# Patient Record
Sex: FEMALE | Race: WHITE | HISPANIC OR LATINO
[De-identification: names, ages, dates, MRNs, and addresses within clinical notes are randomized per-mention and may not be internally consistent; named-entity substitution may affect disease eponyms.]

---

## 2017-02-02 ENCOUNTER — FORM ENCOUNTER (OUTPATIENT)
Age: 37
End: 2017-02-02

## 2017-02-03 ENCOUNTER — APPOINTMENT (OUTPATIENT)
Dept: RHEUMATOLOGY | Facility: CLINIC | Age: 37
End: 2017-02-03

## 2017-02-03 VITALS
HEIGHT: 64 IN | SYSTOLIC BLOOD PRESSURE: 105 MMHG | DIASTOLIC BLOOD PRESSURE: 69 MMHG | BODY MASS INDEX: 25.61 KG/M2 | TEMPERATURE: 98.2 F | OXYGEN SATURATION: 97 % | HEART RATE: 79 BPM | WEIGHT: 150 LBS

## 2017-02-03 DIAGNOSIS — R21 RASH AND OTHER NONSPECIFIC SKIN ERUPTION: ICD-10-CM

## 2017-02-03 DIAGNOSIS — M25.642 STIFFNESS OF LEFT HAND, NOT ELSEWHERE CLASSIFIED: ICD-10-CM

## 2017-02-03 DIAGNOSIS — M62.838 OTHER MUSCLE SPASM: ICD-10-CM

## 2017-02-03 DIAGNOSIS — E55.9 VITAMIN D DEFICIENCY, UNSPECIFIED: ICD-10-CM

## 2017-02-21 ENCOUNTER — RX RENEWAL (OUTPATIENT)
Age: 37
End: 2017-02-21

## 2017-03-01 ENCOUNTER — APPOINTMENT (OUTPATIENT)
Dept: DERMATOLOGY | Facility: CLINIC | Age: 37
End: 2017-03-01

## 2017-03-01 DIAGNOSIS — L21.9 SEBORRHEIC DERMATITIS, UNSPECIFIED: ICD-10-CM

## 2017-03-01 LAB
25(OH)D3 SERPL-MCNC: 27 NG/ML
ALBUMIN SERPL ELPH-MCNC: 4.3 G/DL
ALP BLD-CCNC: 52 U/L
ALT SERPL-CCNC: 15 U/L
ANION GAP SERPL CALC-SCNC: 11 MMOL/L
APPEARANCE: CLEAR
AST SERPL-CCNC: 18 U/L
BACTERIA: ABNORMAL
BASOPHILS # BLD AUTO: 0.01 K/UL
BASOPHILS NFR BLD AUTO: 0.2 %
BILIRUB SERPL-MCNC: 1.2 MG/DL
BILIRUBIN URINE: NEGATIVE
BLOOD URINE: NEGATIVE
BUN SERPL-MCNC: 8 MG/DL
CALCIUM SERPL-MCNC: 9 MG/DL
CCP AB SER IA-ACNC: 10 UNITS
CENTROMERE IGG SER-ACNC: <0.2 AL
CHLORIDE SERPL-SCNC: 103 MMOL/L
CK SERPL-CCNC: 80 U/L
CO2 SERPL-SCNC: 27 MMOL/L
COLOR: YELLOW
CREAT SERPL-MCNC: 0.47 MG/DL
CREAT SPEC-SCNC: 98 MG/DL
CREAT/PROT UR: 0.1 RATIO
CRP SERPL-MCNC: 0.24 MG/DL
DSDNA AB SER-ACNC: <12 IU/ML
ENA RNP AB SER IA-ACNC: <0.2 AL
ENA SCL70 IGG SER IA-ACNC: <0.2 AL
ENA SM AB SER IA-ACNC: <0.2 AL
ENA SS-A AB SER IA-ACNC: <0.2 AL
ENA SS-B AB SER IA-ACNC: <0.2 AL
EOSINOPHIL # BLD AUTO: 0.1 K/UL
EOSINOPHIL NFR BLD AUTO: 1.9 %
ERYTHROCYTE [SEDIMENTATION RATE] IN BLOOD BY WESTERGREN METHOD: 14 MM/HR
G6PD SER-CCNC: 6.4 U/G HB
GLUCOSE QUALITATIVE U: NORMAL MG/DL
GLUCOSE SERPL-MCNC: 84 MG/DL
HCT VFR BLD CALC: 43.6 %
HGB BLD-MCNC: 13.6 G/DL
HYALINE CASTS: 1 /LPF
IMM GRANULOCYTES NFR BLD AUTO: 0.2 %
KETONES URINE: NEGATIVE
LEUKOCYTE ESTERASE URINE: ABNORMAL
LYMPHOCYTES # BLD AUTO: 2.2 K/UL
LYMPHOCYTES NFR BLD AUTO: 42 %
MAN DIFF?: NORMAL
MCHC RBC-ENTMCNC: 28.8 PG
MCHC RBC-ENTMCNC: 31.2 GM/DL
MCV RBC AUTO: 92.4 FL
MICROSCOPIC-UA: NORMAL
MISCELLANEOUS TEST: NORMAL
MONOCYTES # BLD AUTO: 0.22 K/UL
MONOCYTES NFR BLD AUTO: 4.2 %
NEUTROPHILS # BLD AUTO: 2.7 K/UL
NEUTROPHILS NFR BLD AUTO: 51.5 %
NITRITE URINE: NEGATIVE
PH URINE: 8
PLATELET # BLD AUTO: 237 K/UL
POTASSIUM SERPL-SCNC: 4.2 MMOL/L
PROC NAME: NORMAL
PROT SERPL-MCNC: 7.3 G/DL
PROT UR-MCNC: 10 MG/DL
PROTEIN URINE: NEGATIVE MG/DL
RBC # BLD: 4.72 M/UL
RBC # FLD: 12.9 %
RED BLOOD CELLS URINE: 2 /HPF
RF+CCP IGG SER-IMP: NEGATIVE
RHEUMATOID FACT SER QL: <7 IU/ML
RNA POLYMERASE III IGG: 11.6 U
SODIUM SERPL-SCNC: 141 MMOL/L
SPECIFIC GRAVITY URINE: 1.02
SQUAMOUS EPITHELIAL CELLS: 4 /HPF
UROBILINOGEN URINE: 1 MG/DL
WBC # FLD AUTO: 5.24 K/UL
WHITE BLOOD CELLS URINE: 9 /HPF

## 2017-03-02 PROBLEM — L21.9 SEBORRHEIC DERMATITIS: Status: ACTIVE | Noted: 2017-03-01

## 2017-03-03 ENCOUNTER — APPOINTMENT (OUTPATIENT)
Dept: RHEUMATOLOGY | Facility: CLINIC | Age: 37
End: 2017-03-03

## 2017-03-03 VITALS
HEIGHT: 64 IN | TEMPERATURE: 97.6 F | DIASTOLIC BLOOD PRESSURE: 70 MMHG | HEART RATE: 72 BPM | SYSTOLIC BLOOD PRESSURE: 101 MMHG | OXYGEN SATURATION: 97 % | WEIGHT: 150 LBS | BODY MASS INDEX: 25.61 KG/M2

## 2017-04-21 ENCOUNTER — APPOINTMENT (OUTPATIENT)
Dept: RHEUMATOLOGY | Facility: CLINIC | Age: 37
End: 2017-04-21

## 2017-05-13 ENCOUNTER — TRANSCRIPTION ENCOUNTER (OUTPATIENT)
Age: 37
End: 2017-05-13

## 2017-05-16 ENCOUNTER — OUTPATIENT (OUTPATIENT)
Dept: OUTPATIENT SERVICES | Facility: HOSPITAL | Age: 37
LOS: 1 days | End: 2017-05-16
Payer: COMMERCIAL

## 2017-05-16 VITALS
DIASTOLIC BLOOD PRESSURE: 77 MMHG | TEMPERATURE: 98 F | HEART RATE: 92 BPM | HEIGHT: 64 IN | RESPIRATION RATE: 16 BRPM | OXYGEN SATURATION: 997 % | WEIGHT: 151.9 LBS | SYSTOLIC BLOOD PRESSURE: 111 MMHG

## 2017-05-16 DIAGNOSIS — Z01.818 ENCOUNTER FOR OTHER PREPROCEDURAL EXAMINATION: ICD-10-CM

## 2017-05-16 DIAGNOSIS — S92.309A FRACTURE OF UNSPECIFIED METATARSAL BONE(S), UNSPECIFIED FOOT, INITIAL ENCOUNTER FOR CLOSED FRACTURE: ICD-10-CM

## 2017-05-16 DIAGNOSIS — S92.352A DISPLACED FRACTURE OF FIFTH METATARSAL BONE, LEFT FOOT, INITIAL ENCOUNTER FOR CLOSED FRACTURE: ICD-10-CM

## 2017-05-16 DIAGNOSIS — N75.0 CYST OF BARTHOLIN'S GLAND: Chronic | ICD-10-CM

## 2017-05-16 LAB
HCT VFR BLD CALC: 40.3 % — SIGNIFICANT CHANGE UP (ref 34.5–45)
HGB BLD-MCNC: 13.1 G/DL — SIGNIFICANT CHANGE UP (ref 11.5–15.5)
MCHC RBC-ENTMCNC: 29.4 PG — SIGNIFICANT CHANGE UP (ref 27–34)
MCHC RBC-ENTMCNC: 32.5 GM/DL — SIGNIFICANT CHANGE UP (ref 32–36)
MCV RBC AUTO: 90.6 FL — SIGNIFICANT CHANGE UP (ref 80–100)
PLATELET # BLD AUTO: 212 K/UL — SIGNIFICANT CHANGE UP (ref 150–400)
RBC # BLD: 4.45 M/UL — SIGNIFICANT CHANGE UP (ref 3.8–5.2)
RBC # FLD: 13.3 % — SIGNIFICANT CHANGE UP (ref 10.3–14.5)
WBC # BLD: 7.58 K/UL — SIGNIFICANT CHANGE UP (ref 3.8–10.5)
WBC # FLD AUTO: 7.58 K/UL — SIGNIFICANT CHANGE UP (ref 3.8–10.5)

## 2017-05-16 PROCEDURE — 85027 COMPLETE CBC AUTOMATED: CPT

## 2017-05-16 RX ORDER — SODIUM CHLORIDE 9 MG/ML
3 INJECTION INTRAMUSCULAR; INTRAVENOUS; SUBCUTANEOUS EVERY 8 HOURS
Qty: 0 | Refills: 0 | Status: DISCONTINUED | OUTPATIENT
Start: 2017-05-18 | End: 2017-06-02

## 2017-05-16 RX ORDER — ACETAMINOPHEN 500 MG
975 TABLET ORAL ONCE
Qty: 0 | Refills: 0 | Status: COMPLETED | OUTPATIENT
Start: 2017-05-18 | End: 2017-05-18

## 2017-05-16 RX ORDER — LIDOCAINE HCL 20 MG/ML
0.2 VIAL (ML) INJECTION ONCE
Qty: 0 | Refills: 0 | Status: DISCONTINUED | OUTPATIENT
Start: 2017-05-18 | End: 2017-06-02

## 2017-05-16 RX ORDER — CELECOXIB 200 MG/1
200 CAPSULE ORAL ONCE
Qty: 0 | Refills: 0 | Status: COMPLETED | OUTPATIENT
Start: 2017-05-18 | End: 2017-05-18

## 2017-05-16 RX ORDER — CEFAZOLIN SODIUM 1 G
2000 VIAL (EA) INJECTION ONCE
Qty: 0 | Refills: 0 | Status: DISCONTINUED | OUTPATIENT
Start: 2017-05-18 | End: 2017-06-02

## 2017-05-16 NOTE — H&P PST ADULT - HISTORY OF PRESENT ILLNESS
36 y.o. female presents with Left 5th metatarsal fracture s/p fall at home 5 days ago. She is scheduled for Left Foot 5th Metatarsal ORIF.

## 2017-05-16 NOTE — H&P PST ADULT - NSANTHOSAYNRD_GEN_A_CORE
No. HARINI screening performed.  STOP BANG Legend: 0-2 = LOW Risk; 3-4 = INTERMEDIATE Risk; 5-8 = HIGH Risk

## 2017-05-16 NOTE — H&P PST ADULT - PMH
VERA positive    Dermatitis    Fatigue    Inflammatory arthritis  questionable, all testing negative, but presents with arthritis like changes and morning stiffness  Metatarsal fracture  Left foot,  5th metatarsal  Pneumonia  with septic shock, 03/2016  Raynaud's disease VERA positive    Dermatitis    Fatigue    Inflammatory arthritis    Metatarsal fracture  Left foot,  5th metatarsal  Pneumonia  with septic shock, 03/2016  Raynaud's disease

## 2017-05-18 ENCOUNTER — OUTPATIENT (OUTPATIENT)
Dept: OUTPATIENT SERVICES | Facility: HOSPITAL | Age: 37
LOS: 1 days | End: 2017-05-18
Payer: COMMERCIAL

## 2017-05-18 ENCOUNTER — TRANSCRIPTION ENCOUNTER (OUTPATIENT)
Age: 37
End: 2017-05-18

## 2017-05-18 VITALS
SYSTOLIC BLOOD PRESSURE: 105 MMHG | HEART RATE: 90 BPM | RESPIRATION RATE: 16 BRPM | TEMPERATURE: 98 F | OXYGEN SATURATION: 100 % | DIASTOLIC BLOOD PRESSURE: 66 MMHG

## 2017-05-18 VITALS
TEMPERATURE: 98 F | DIASTOLIC BLOOD PRESSURE: 63 MMHG | HEIGHT: 64 IN | WEIGHT: 151.9 LBS | SYSTOLIC BLOOD PRESSURE: 95 MMHG | HEART RATE: 95 BPM | OXYGEN SATURATION: 98 % | RESPIRATION RATE: 15 BRPM

## 2017-05-18 DIAGNOSIS — N75.0 CYST OF BARTHOLIN'S GLAND: Chronic | ICD-10-CM

## 2017-05-18 DIAGNOSIS — S92.352A DISPLACED FRACTURE OF FIFTH METATARSAL BONE, LEFT FOOT, INITIAL ENCOUNTER FOR CLOSED FRACTURE: ICD-10-CM

## 2017-05-18 PROCEDURE — C1713: CPT

## 2017-05-18 PROCEDURE — 28485 OPTX METATARSAL FX EACH: CPT | Mod: LT

## 2017-05-18 RX ORDER — SODIUM CHLORIDE 9 MG/ML
1000 INJECTION, SOLUTION INTRAVENOUS
Qty: 0 | Refills: 0 | Status: DISCONTINUED | OUTPATIENT
Start: 2017-05-18 | End: 2017-06-02

## 2017-05-18 RX ORDER — OXYCODONE HYDROCHLORIDE 5 MG/1
10 TABLET ORAL ONCE
Qty: 0 | Refills: 0 | Status: DISCONTINUED | OUTPATIENT
Start: 2017-05-18 | End: 2017-05-18

## 2017-05-18 RX ORDER — ONDANSETRON 8 MG/1
4 TABLET, FILM COATED ORAL ONCE
Qty: 0 | Refills: 0 | Status: COMPLETED | OUTPATIENT
Start: 2017-05-18 | End: 2017-05-18

## 2017-05-18 RX ORDER — CELECOXIB 200 MG/1
200 CAPSULE ORAL ONCE
Qty: 0 | Refills: 0 | Status: COMPLETED | OUTPATIENT
Start: 2017-05-18 | End: 2017-05-18

## 2017-05-18 RX ADMIN — Medication 975 MILLIGRAM(S): at 14:37

## 2017-05-18 RX ADMIN — CELECOXIB 200 MILLIGRAM(S): 200 CAPSULE ORAL at 17:31

## 2017-05-18 RX ADMIN — CELECOXIB 200 MILLIGRAM(S): 200 CAPSULE ORAL at 14:37

## 2017-05-18 RX ADMIN — ONDANSETRON 4 MILLIGRAM(S): 8 TABLET, FILM COATED ORAL at 17:32

## 2017-05-18 RX ADMIN — OXYCODONE HYDROCHLORIDE 10 MILLIGRAM(S): 5 TABLET ORAL at 17:32

## 2017-05-18 NOTE — ASU DISCHARGE PLAN (ADULT/PEDIATRIC). - MEDICATION SUMMARY - MEDICATIONS TO TAKE
I will START or STAY ON the medications listed below when I get home from the hospital:    Percocet 5/325 oral tablet  -- 1 tab(s) by mouth every 4 hours, As Needed MDD:6 tabs  -- Caution federal law prohibits the transfer of this drug to any person other  than the person for whom it was prescribed.  May cause drowsiness.  Alcohol may intensify this effect.  Use care when operating dangerous machinery.  This prescription cannot be refilled.  This product contains acetaminophen.  Do not use  with any other product containing acetaminophen to prevent possible liver damage.  Using more of this medication than prescribed may cause serious breathing problems.    -- Indication: For pain    amLODIPine 2.5 mg oral tablet  -- 1 tab(s) by mouth once a day  -- Indication: For home med    Pepcid 20 mg oral tablet  --  by mouth 2 doses preop  -- Indication: For home med

## 2017-05-18 NOTE — ASU DISCHARGE PLAN (ADULT/PEDIATRIC). - NOTIFY
Swelling that continues/Numbness, color, or temperature change to extremity/Fever greater than 101/Bleeding that does not stop/Pain not relieved by Medications

## 2017-05-18 NOTE — ASU DISCHARGE PLAN (ADULT/PEDIATRIC). - SPECIAL INSTRUCTIONS
non-weight bearing in boot for two weeks on left leg  keep dressing clean and dry  follow up with Dr Markham in 7-10 days. Call

## 2017-05-18 NOTE — ASU PATIENT PROFILE, ADULT - PMH
VERA positive    Dermatitis    Fatigue    Inflammatory arthritis    Metatarsal fracture  Left foot,  5th metatarsal  Pneumonia  with septic shock, 03/2016  Raynaud's disease

## 2017-06-26 ENCOUNTER — RX RENEWAL (OUTPATIENT)
Age: 37
End: 2017-06-26

## 2017-06-29 ENCOUNTER — APPOINTMENT (OUTPATIENT)
Dept: RHEUMATOLOGY | Facility: CLINIC | Age: 37
End: 2017-06-29

## 2017-10-24 ENCOUNTER — APPOINTMENT (OUTPATIENT)
Dept: RHEUMATOLOGY | Facility: CLINIC | Age: 37
End: 2017-10-24
Payer: COMMERCIAL

## 2017-10-24 VITALS
RESPIRATION RATE: 16 BRPM | HEART RATE: 70 BPM | SYSTOLIC BLOOD PRESSURE: 115 MMHG | OXYGEN SATURATION: 98 % | DIASTOLIC BLOOD PRESSURE: 67 MMHG | HEIGHT: 64 IN | TEMPERATURE: 97.8 F

## 2017-10-24 PROCEDURE — 99215 OFFICE O/P EST HI 40 MIN: CPT

## 2017-10-30 LAB
25(OH)D3 SERPL-MCNC: 29.6 NG/ML
ALBUMIN SERPL ELPH-MCNC: 4.4 G/DL
ALP BLD-CCNC: 53 U/L
ALT SERPL-CCNC: 16 U/L
ANION GAP SERPL CALC-SCNC: 13 MMOL/L
APPEARANCE: CLEAR
AST SERPL-CCNC: 16 U/L
B2 GLYCOPROT1 AB SER QL: NEGATIVE
B2 GLYCOPROT1 IGA SERPL IA-ACNC: <5 SAU
BACTERIA: NEGATIVE
BASOPHILS # BLD AUTO: 0.01 K/UL
BASOPHILS NFR BLD AUTO: 0.2 %
BILIRUB SERPL-MCNC: 0.8 MG/DL
BILIRUBIN URINE: NEGATIVE
BLOOD URINE: NEGATIVE
BUN SERPL-MCNC: 15 MG/DL
C3 SERPL-MCNC: 99 MG/DL
C4 SERPL-MCNC: 16 MG/DL
CALCIUM SERPL-MCNC: 9.5 MG/DL
CARDIOLIPIN AB SER IA-ACNC: NEGATIVE
CCP AB SER IA-ACNC: <8 UNITS
CENTROMERE IGG SER-ACNC: <0.2 AL
CHLORIDE SERPL-SCNC: 104 MMOL/L
CK SERPL-CCNC: 59 U/L
CO2 SERPL-SCNC: 25 MMOL/L
COLOR: YELLOW
CONFIRM: 26.1 SEC
CREAT SERPL-MCNC: 0.56 MG/DL
CREAT SPEC-SCNC: 162 MG/DL
CREAT/PROT UR: 0.1 RATIO
CRP SERPL-MCNC: 0.4 MG/DL
DRVVT IMM 1:2 NP PPP: NORMAL
DRVVT SCREEN TO CONFIRM RATIO: 0.93 RATIO
DSDNA AB SER-ACNC: <12 IU/ML
ENA RNP AB SER IA-ACNC: <0.2 AL
ENA SCL70 IGG SER IA-ACNC: <0.2 AL
ENA SM AB SER IA-ACNC: <0.2 AL
ENA SS-A AB SER IA-ACNC: <0.2 AL
ENA SS-B AB SER IA-ACNC: <0.2 AL
EOSINOPHIL # BLD AUTO: 0.38 K/UL
EOSINOPHIL NFR BLD AUTO: 6.2 %
GLUCOSE QUALITATIVE U: NEGATIVE MG/DL
GLUCOSE SERPL-MCNC: 74 MG/DL
HCT VFR BLD CALC: 40.7 %
HGB BLD-MCNC: 13.1 G/DL
HYALINE CASTS: 2 /LPF
IMM GRANULOCYTES NFR BLD AUTO: 0.2 %
KETONES URINE: NEGATIVE
LEUKOCYTE ESTERASE URINE: NEGATIVE
LYMPHOCYTES # BLD AUTO: 2.02 K/UL
LYMPHOCYTES NFR BLD AUTO: 32.7 %
MAN DIFF?: NORMAL
MCHC RBC-ENTMCNC: 29.6 PG
MCHC RBC-ENTMCNC: 32.2 GM/DL
MCV RBC AUTO: 92.1 FL
MICROSCOPIC-UA: NORMAL
MONOCYTES # BLD AUTO: 0.38 K/UL
MONOCYTES NFR BLD AUTO: 6.2 %
NEUTROPHILS # BLD AUTO: 3.37 K/UL
NEUTROPHILS NFR BLD AUTO: 54.5 %
NITRITE URINE: NEGATIVE
PH URINE: 5.5
PLATELET # BLD AUTO: 219 K/UL
POTASSIUM SERPL-SCNC: 4 MMOL/L
PROT SERPL-MCNC: 7.3 G/DL
PROT UR-MCNC: 13 MG/DL
PROTEIN URINE: NEGATIVE MG/DL
RBC # BLD: 4.42 M/UL
RBC # FLD: 13.2 %
RED BLOOD CELLS URINE: 4 /HPF
RF+CCP IGG SER-IMP: NEGATIVE
RNA POLYMERASE III IGG: <10 U
SCREEN DRVVT: 27.3 SEC
SILICA CLOTTING TIME INTERPRETATION: NORMAL
SILICA CLOTTING TIME S/C: 1.02 RATIO
SODIUM SERPL-SCNC: 142 MMOL/L
SPECIFIC GRAVITY URINE: 1.03
SQUAMOUS EPITHELIAL CELLS: 2 /HPF
UROBILINOGEN URINE: NEGATIVE MG/DL
WBC # FLD AUTO: 6.17 K/UL
WHITE BLOOD CELLS URINE: 3 /HPF

## 2017-11-20 LAB
TPMT ENZYME INTERPRETATION: NORMAL
TPMT ENZYME METHODOLOGY: NORMAL
TPMT ENZYME: 19.3

## 2017-12-08 ENCOUNTER — APPOINTMENT (OUTPATIENT)
Dept: RHEUMATOLOGY | Facility: CLINIC | Age: 37
End: 2017-12-08

## 2018-03-14 ENCOUNTER — APPOINTMENT (OUTPATIENT)
Dept: INTERNAL MEDICINE | Facility: CLINIC | Age: 38
End: 2018-03-14
Payer: COMMERCIAL

## 2018-03-14 VITALS
WEIGHT: 176 LBS | HEART RATE: 85 BPM | HEIGHT: 64 IN | OXYGEN SATURATION: 95 % | TEMPERATURE: 99.5 F | RESPIRATION RATE: 16 BRPM | BODY MASS INDEX: 30.05 KG/M2 | DIASTOLIC BLOOD PRESSURE: 76 MMHG | SYSTOLIC BLOOD PRESSURE: 116 MMHG

## 2018-03-14 DIAGNOSIS — R59.9 ENLARGED LYMPH NODES, UNSPECIFIED: ICD-10-CM

## 2018-03-14 LAB — RESULT: NEGATIVE

## 2018-03-14 PROCEDURE — 99214 OFFICE O/P EST MOD 30 MIN: CPT

## 2018-03-14 RX ORDER — MELOXICAM 15 MG/1
15 TABLET ORAL AT BEDTIME
Qty: 90 | Refills: 0 | Status: DISCONTINUED | COMMUNITY
Start: 2017-02-03 | End: 2018-03-14

## 2018-03-14 RX ORDER — PREDNISONE 2.5 MG/1
2.5 TABLET ORAL
Qty: 90 | Refills: 1 | Status: DISCONTINUED | COMMUNITY
Start: 2017-03-03 | End: 2018-03-14

## 2018-03-14 RX ORDER — PREDNISONE 2.5 MG/1
2.5 TABLET ORAL DAILY
Qty: 90 | Refills: 0 | Status: DISCONTINUED | COMMUNITY
Start: 2017-10-24 | End: 2018-03-14

## 2018-03-14 RX ORDER — CLOBETASOL PROPIONATE 0.05 G/100ML
0.05 SHAMPOO TOPICAL DAILY
Qty: 1 | Refills: 3 | Status: DISCONTINUED | COMMUNITY
Start: 2017-03-02 | End: 2018-03-14

## 2018-03-14 RX ORDER — AMOXICILLIN AND CLAVULANATE POTASSIUM 500; 125 MG/1; MG/1
500-125 TABLET, FILM COATED ORAL TWICE DAILY
Qty: 20 | Refills: 0 | Status: COMPLETED | COMMUNITY
Start: 2018-03-14 | End: 2018-03-24

## 2018-03-29 ENCOUNTER — APPOINTMENT (OUTPATIENT)
Dept: RHEUMATOLOGY | Facility: CLINIC | Age: 38
End: 2018-03-29
Payer: COMMERCIAL

## 2018-03-29 VITALS
HEIGHT: 64 IN | SYSTOLIC BLOOD PRESSURE: 106 MMHG | HEART RATE: 80 BPM | RESPIRATION RATE: 16 BRPM | TEMPERATURE: 98.7 F | DIASTOLIC BLOOD PRESSURE: 72 MMHG | OXYGEN SATURATION: 96 % | WEIGHT: 177 LBS | BODY MASS INDEX: 30.22 KG/M2

## 2018-03-29 PROCEDURE — 99215 OFFICE O/P EST HI 40 MIN: CPT

## 2018-03-30 LAB
25(OH)D3 SERPL-MCNC: 24.6 NG/ML
ALBUMIN SERPL ELPH-MCNC: 4.2 G/DL
ALP BLD-CCNC: 44 U/L
ALT SERPL-CCNC: 16 U/L
ANION GAP SERPL CALC-SCNC: 15 MMOL/L
APPEARANCE: CLEAR
AST SERPL-CCNC: 18 U/L
BACTERIA: NEGATIVE
BASOPHILS # BLD AUTO: 0.02 K/UL
BASOPHILS NFR BLD AUTO: 0.4 %
BILIRUB SERPL-MCNC: 0.6 MG/DL
BILIRUBIN URINE: NEGATIVE
BLOOD URINE: ABNORMAL
BUN SERPL-MCNC: 11 MG/DL
C3 SERPL-MCNC: 90 MG/DL
C4 SERPL-MCNC: 14 MG/DL
CALCIUM SERPL-MCNC: 8.9 MG/DL
CHLORIDE SERPL-SCNC: 104 MMOL/L
CO2 SERPL-SCNC: 24 MMOL/L
COLOR: YELLOW
CREAT SERPL-MCNC: 0.55 MG/DL
CREAT SPEC-SCNC: 117 MG/DL
CREAT/PROT UR: 0.1 RATIO
EOSINOPHIL # BLD AUTO: 0.32 K/UL
EOSINOPHIL NFR BLD AUTO: 5.9 %
ERYTHROCYTE [SEDIMENTATION RATE] IN BLOOD BY WESTERGREN METHOD: 15 MM/HR
GLUCOSE QUALITATIVE U: NEGATIVE MG/DL
GLUCOSE SERPL-MCNC: 84 MG/DL
HCT VFR BLD CALC: 41.4 %
HGB BLD-MCNC: 12.8 G/DL
HYALINE CASTS: 0 /LPF
IMM GRANULOCYTES NFR BLD AUTO: 0.2 %
KETONES URINE: NEGATIVE
LEUKOCYTE ESTERASE URINE: NEGATIVE
LYMPHOCYTES # BLD AUTO: 2.07 K/UL
LYMPHOCYTES NFR BLD AUTO: 38.1 %
MAN DIFF?: NORMAL
MCHC RBC-ENTMCNC: 29.5 PG
MCHC RBC-ENTMCNC: 30.9 GM/DL
MCV RBC AUTO: 95.4 FL
MICROSCOPIC-UA: NORMAL
MONOCYTES # BLD AUTO: 0.38 K/UL
MONOCYTES NFR BLD AUTO: 7 %
NEUTROPHILS # BLD AUTO: 2.64 K/UL
NEUTROPHILS NFR BLD AUTO: 48.4 %
NITRITE URINE: NEGATIVE
PH URINE: 5
PLATELET # BLD AUTO: 202 K/UL
POTASSIUM SERPL-SCNC: 4.1 MMOL/L
PROT SERPL-MCNC: 7 G/DL
PROT UR-MCNC: 9 MG/DL
PROTEIN URINE: NEGATIVE MG/DL
RBC # BLD: 4.34 M/UL
RBC # FLD: 13.9 %
RED BLOOD CELLS URINE: 3 /HPF
SODIUM SERPL-SCNC: 143 MMOL/L
SPECIFIC GRAVITY URINE: 1.03
SQUAMOUS EPITHELIAL CELLS: 2 /HPF
TSH SERPL-ACNC: 2.21 UIU/ML
UROBILINOGEN URINE: NEGATIVE MG/DL
WBC # FLD AUTO: 5.44 K/UL
WHITE BLOOD CELLS URINE: 4 /HPF

## 2018-04-01 LAB — DSDNA AB SER-ACNC: <12 IU/ML

## 2018-04-26 NOTE — ASU PREOP CHECKLIST - IDENTIFICATION BAND VERIFIED
[address] 
[phone] Patient: Yee Miller MRN: [id number] :1943 Visit Information Date & Time Provider Department Dept. Phone Encounter #  
 2018  2:30 PM Doreen Reyes, [address] [place] [address],[address] [phone] Follow-up Instructions Return in about 3 months (around 2018). Follow-up and Disposition History Upcoming Health Maintenance Date Due ZOSTER VACCINE AGE 60> 10/17/2004 MEDICARE YEARLY EXAM 3/15/2018 Influenza Age 5 to Adult 2018* COLONOSCOPY 2018 BREAST CANCER SCRN MAMMOGRAM 2019 GLAUCOMA SCREENING Q2Y 2019 DTaP/Tdap/Td series (2 - Td) 2022 *Topic was postponed. The date shown is not the original due date. Allergies as of 2018  Review Complete On: 2018 By: Dru Cordova III, DO Severity Noted Reaction Type Reactions Compazine [Prochlorperazine]  2016    Other (comments) Childhood reaction Erythromycin  2016   Intolerance Nausea and Vomiting Indocin [Indomethacin]  2016   Intolerance Other (comments) Made her feel weird to the point it scared her Keflex [Cephalexin]  2016   Intolerance Nausea and Vomiting Current Immunizations  Reviewed on 2016 Name Date Pneumococcal Conjugate (PCV-13) 2016 Pneumococcal Polysaccharide (PPSV-23) 6/15/2017 Not reviewed this visit You Were Diagnosed With   
  
 Codes Comments Essential hypertension    -  Primary ICD-10-CM: I10 
ICD-9-CM: 401.9 Hyperlipidemia LDL goal <70     ICD-10-CM: E78.5 ICD-9-CM: 272.4 Insomnia, unspecified type     ICD-10-CM: G47.00 ICD-9-CM: 780.52 Anxiety     ICD-10-CM: F41.9 ICD-9-CM: 300.00 Osteoarthritis of spine with radiculopathy, cervical region     ICD-10-CM: M47.22 
ICD-9-CM: 721.0 Vitals BP Pulse Temp Resp Height(growth percentile) Weight(growth percentile) 146/72 (BP 1 Location: Left arm, BP Patient Position: Sitting) 72 98.3 °F (36.8 °C) (Oral) 16 5' 7.52\" (1.715 m) 145 lb (65.8 kg) SpO2 BMI OB Status Smoking Status 99% 22.36 kg/m2 Postmenopausal Current Some Day Smoker Vitals History BMI and BSA Data Body Mass Index Body Surface Area  
 22.36 kg/m 2 1.77 m 2 Preferred Pharmacy Pharmacy Name Phone [facility] #[id number]- [address] [facility] [phone] Your Updated Medication List  
  
   
This list is accurate as of 4/26/18  3:54 PM.  Always use your most recent med list.  
  
  
  
  
 acetaminophen 650 mg Tber Commonly known as:  TYLENOL Take 650 mg by mouth every six (6) hours as needed for Pain. amLODIPine 10 mg tablet Commonly known as:  Sherrye Acron Take 0.5 Tabs by mouth daily. aspirin delayed-release 81 mg tablet Take 1 Tab by mouth daily. busPIRone 15 mg tablet Commonly known as:  BUSPAR Take 0.5 Tabs by mouth two (2) times a day. Indications: Generalized Anxiety Disorder CALCIUM-MAGNESIUM-ZINC PO Take  by mouth three (3) times daily. diclofenac 1 % Gel Commonly known as:  VOLTAREN Apply  to affected area four (4) times daily. docusate sodium 100 mg Tab Take  by mouth. famotidine 20 mg tablet Commonly known as:  PEPCID Take 20 mg by mouth two (2) times a day. FISH -1,200 mg Cap Generic drug:  omega-3 fatty acids-fish oil Take  by mouth. FISH OIL PO Take 1,200 mg by mouth three (3) times daily. FLONASE SENSIMIST 27.5 mcg/actuation nasal spray Generic drug:  fluticasone 2 Sprays by Nasal route daily. glucosamine sulfate 1,000 mg Cap Take  by mouth.  
  
 ketoconazole 2 % topical cream  
Commonly known as:  NIZORAL Apply  to affected area daily. lisinopril 30 mg tablet Commonly known as:  Disha Leslie Take 1 Tab by mouth daily. Indications: hypertension  
  
 melatonin 1 mg tablet Take 3 mg by mouth. PAPAYA ENZYME Tab Generic drug:  carica papaya Take  by mouth daily as needed. pravastatin 20 mg tablet Commonly known as:  PRAVACHOL Take 1 Tab by mouth nightly. tiZANidine 2 mg tablet Commonly known as:  Derinda Dole Take  by mouth. traZODone 50 mg tablet Commonly known as:  Ozzy Spatz Take 1 Tab by mouth nightly. valACYclovir 1 gram tablet Commonly known as:  VALTREX Take 2 Tabs by mouth two (2) times a day. Indications: SUPPRESSION OF RECURRENT HERPES SIMPLEX INFECTION  
  
 varicella-zoster recombinant (PF) 50 mcg/0.5 mL Susr injection Commonly known as:  SHINGRIX (PF)  
0.5 mL by IntraMUSCular route once for 1 dose. Repeat 2nd dose in 2-6 months. VITAMIN B COMPLEX PO Take 1 mL by mouth daily. VITAMIN D3 PO Take 400 Int'l Units by mouth daily. Prescriptions Printed Refills  
 varicella-zoster recombinant, PF, (SHINGRIX, PF,) 50 mcg/0.5 mL susr injection 1 Si.5 mL by IntraMUSCular route once for 1 dose. Repeat 2nd dose in 2-6 months. Class: Print Route: IntraMUSCular Prescriptions Sent to Pharmacy Refills  
 pravastatin (PRAVACHOL) 20 mg tablet 3 Sig: Take 1 Tab by mouth nightly. Class: Normal  
 Pharmacy: [address] Ph #: [phone] Route: Oral  
 traZODone (DESYREL) 50 mg tablet 3 Sig: Take 1 Tab by mouth nightly. Class: Normal  
 Pharmacy: [address] Ph #: [phone] Route: Oral  
 busPIRone (BUSPAR) 15 mg tablet 3 Sig: Take 0.5 Tabs by mouth two (2) times a day. Indications: Generalized Anxiety Disorder  Class: Normal  
 Pharmacy: [facility]/pharmacy #[id number] - [address] , [address] [place] [address] Ph #: [phone] Route: Oral  
 aspirin delayed-release 81 mg tablet 3 Sig: Take 1 Tab by mouth daily. Class: Normal  
 Pharmacy: [facility] , [address] [place] [address] Ph #: [phone] Route: Oral  
  
We Performed the Following REFERRAL TO PAIN MANAGEMENT [JGY546 Custom] Comments:  
 Please evaluate patient for chronic neck oa. Follow-up Instructions Return in about 3 months (around 7/26/2018). Referral Information Referral ID Referred By Referred To  
  
 [id number] [address], [address] [facility], 200 [facility] Visits Status Start Date End Date 1 New Request 4/26/18 4/26/19 If your referral has a status of pending review or denied, additional information will be sent to support the outcome of this decision. Patient Instructions Insomnia: Care Instructions Your Care Instructions Insomnia is the inability to sleep well. It is a common problem for most people at some time. Insomnia may make it hard for you to get to sleep, stay asleep, or sleep as long as you need to. This can make you tired and grouchy during the day. It can also make you forgetful, less effective at work, and unhappy. Insomnia can be caused by conditions such as depression or anxiety. Pain can also affect your ability to sleep. When these problems are solved, the insomnia usually clears up. But sometimes bad sleep habits can cause insomnia. If insomnia is affecting your work or your enjoyment of life, you can take steps to improve your sleep. Follow-up care is a key part of your treatment and safety.  Be sure to make and go to all appointments, and call your doctor if you are having problems. It's also a good idea to know your test results and keep a list of the medicines you take. How can you care for yourself at home? What to avoid · Do not have drinks with caffeine, such as coffee or black tea, for 8 hours before bed. · Do not smoke or use other types of tobacco near bedtime. Nicotine is a stimulant and can keep you awake. · Avoid drinking alcohol late in the evening, because it can cause you to wake in the middle of the night. · Do not eat a big meal close to bedtime. If you are hungry, eat a light snack. · Do not drink a lot of water close to bedtime, because the need to urinate may wake you up during the night. · Do not read or watch TV in bed. Use the bed only for sleeping and sexual activity. What to try · Go to bed at the same time every night, and wake up at the same time every morning. Do not take naps during the day. · Keep your bedroom quiet, dark, and cool. · Sleep on a comfortable pillow and mattress. · If watching the clock makes you anxious, turn it facing away from you so you cannot see the time. · If you worry when you lie down, start a worry book. Well before bedtime, write down your worries, and then set the book and your concerns aside. · Try meditation or other relaxation techniques before you go to bed. · If you cannot fall asleep, get up and go to another room until you feel sleepy. Do something relaxing. Repeat your bedtime routine before you go to bed again. · Make your house quiet and calm about an hour before bedtime. Turn down the lights, turn off the TV, log off the computer, and turn down the volume on music. This can help you relax after a busy day. When should you call for help? Watch closely for changes in your health, and be sure to contact your doctor if: 
? · Your efforts to improve your sleep do not work. ? · Your insomnia gets worse.   
? · You have been feeling down, depressed, or hopeless or have lost interest in things that you usually enjoy. Where can you learn more? Go to http://alfa-gino.info/. Enter P513 in the search box to learn more about \"Insomnia: Care Instructions. \" Current as of: July 26, 2016 Content Version: 11.4 © 8902-1204 [employer]. Care instructions adapted under license by [employer] (which disclaims liability or warranty for this information). If you have questions about a medical condition or this instruction, always ask your healthcare professional. [address] any warranty or liability for your use of this information. Learning About Sleeping Well What does sleeping well mean? Sleeping well means getting enough sleep. How much sleep is enough varies among people. The number of hours you sleep is not as important as how you feel when you wake up. If you do not feel refreshed, you probably need more sleep. Another sign of not getting enough sleep is feeling tired during the day. The average total nightly sleep time is 7½ to 8 hours. Healthy adults may need a little more or a little less than this. Why is getting enough sleep important? Getting enough quality sleep is a basic part of good health. When your sleep suffers, your mood and your thoughts can suffer too. You may find yourself feeling more grumpy or stressed. Not getting enough sleep also can lead to serious problems, including injury, accidents, anxiety, and depression. What might cause poor sleeping? Many things can cause sleep problems, including: · Stress. Stress can be caused by fear about a single event, such as giving a speech. Or you may have ongoing stress, such as worry about work or school. · Depression, anxiety, and other mental or emotional conditions. · Changes in your sleep habits or surroundings.  This includes changes that happen where you sleep, such as noise, light, or sleeping in a different bed. It also includes changes in your sleep pattern, such as having jet lag or working a late shift. · Health problems, such as pain, breathing problems, and restless legs syndrome. · Lack of regular exercise. How can you help yourself? Here are some tips that may help you sleep more soundly and wake up feeling more refreshed. Your sleeping area · Use your bedroom only for sleeping and sex. A bit of light reading may help you fall asleep. But if it doesn't, do your reading elsewhere in the house. Don't watch TV in bed. · Be sure your bed is big enough to stretch out comfortably, especially if you have a sleep partner. · Keep your bedroom quiet, dark, and cool. Use curtains, blinds, or a sleep mask to block out light. To block out noise, use earplugs, soothing music, or a \"white noise\" machine. Your evening and bedtime routine · Create a relaxing bedtime routine. You might want to take a warm shower or bath, listen to soothing music, or drink a cup of noncaffeinated tea. · Go to bed at the same time every night. And get up at the same time every morning, even if you feel tired. What to avoid · Limit caffeine (coffee, tea, caffeinated sodas) during the day, and don't have any for at least 4 to 6 hours before bedtime. · Don't drink alcohol before bedtime. Alcohol can cause you to wake up more often during the night. · Don't smoke or use tobacco, especially in the evening. Nicotine can keep you awake. · Don't take naps during the day, especially close to bedtime. · Don't lie in bed awake for too long. If you can't fall asleep, or if you wake up in the middle of the night and can't get back to sleep within 15 minutes or so, get out of bed and go to another room until you feel sleepy. · Don't take medicine right before bed that may keep you awake or make you feel hyper or energized. Your doctor can tell you if your medicine may do this and if you can take it earlier in the day. If you can't sleep · Imagine yourself in a peaceful, pleasant scene. Focus on the details and feelings of being in a place that is relaxing. · Get up and do a quiet or boring activity until you feel sleepy. · Don't drink any liquids after 6 p.m. if you wake up often because you have to go to the bathroom. Where can you learn more? Go to http://alfa-gino.info/. Enter O509 in the search box to learn more about \"Learning About Sleeping Well. \" Current as of: May 12, 2017 Content Version: 11.4 © 3732-3859 [employer]. Care instructions adapted under license by [employer] (which disclaims liability or warranty for this information). If you have questions about a medical condition or this instruction, always ask your healthcare professional. [address] any warranty or liability for your use of this information. Patient Instructions History Introducing [facility]! Chirstina Whaley introduces NurseGrid patient portal. Now you can access parts of your medical record, email your doctor's office, and request medication refills online. 1. In your internet browser, go to [email or web address] 2. Click on the First Time User? Click Here link in the Sign In box. You will see the New Member Sign Up page. 3. Enter your NurseGrid Access Code exactly as it appears below. You will not need to use this code after youve completed the sign-up process. If you do not sign up before the expiration date, you must request a new code. · NurseGrid Access Code: [email or web address] Expires: 7/25/2018  3:50 PM 
 
4. Enter the last four digits of your Social Security Number (xxxx) and Date of Birth (mm/dd/yyyy) as indicated and click Submit. You will be taken to the next sign-up page. 5. Create a NurseGrid ID. This will be your NurseGrid login ID and cannot be changed, so think of one that is secure and easy to remember. 6. Create a Astro password. You can change your password at any time. 7. Enter your Password Reset Question and Answer. This can be used at a later time if you forget your password. 8. Enter your e-mail address. You will receive e-mail notification when new information is available in [address] 9. Click Sign Up. You can now view and download portions of your medical record. 10. Click the Download Summary menu link to download a portable copy of your medical information. If you have questions, please visit the Frequently Asked Questions section of the Astro website. Remember, Astro is NOT to be used for urgent needs. For medical emergencies, dial 911. Now available from your iPhone and Android! Please provide this summary of care documentation to your next provider. Your primary care clinician is listed as [place] Job If you have any questions after today's visit, please call [phone]. done

## 2018-05-11 ENCOUNTER — APPOINTMENT (OUTPATIENT)
Dept: RHEUMATOLOGY | Facility: CLINIC | Age: 38
End: 2018-05-11
Payer: COMMERCIAL

## 2018-05-11 VITALS
SYSTOLIC BLOOD PRESSURE: 112 MMHG | HEART RATE: 78 BPM | OXYGEN SATURATION: 98 % | WEIGHT: 171 LBS | HEIGHT: 64 IN | BODY MASS INDEX: 29.19 KG/M2 | TEMPERATURE: 98.2 F | DIASTOLIC BLOOD PRESSURE: 69 MMHG | RESPIRATION RATE: 16 BRPM

## 2018-05-11 DIAGNOSIS — M25.40 EFFUSION, UNSPECIFIED JOINT: ICD-10-CM

## 2018-05-11 DIAGNOSIS — M25.60 STIFFNESS OF UNSPECIFIED JOINT, NOT ELSEWHERE CLASSIFIED: ICD-10-CM

## 2018-05-11 PROCEDURE — 99215 OFFICE O/P EST HI 40 MIN: CPT

## 2018-05-15 LAB
25(OH)D3 SERPL-MCNC: 35.7 NG/ML
ALBUMIN SERPL ELPH-MCNC: 4.5 G/DL
ALP BLD-CCNC: 50 U/L
ALT SERPL-CCNC: 19 U/L
ANION GAP SERPL CALC-SCNC: 14 MMOL/L
APPEARANCE: CLEAR
AST SERPL-CCNC: 13 U/L
BACTERIA: NEGATIVE
BASOPHILS # BLD AUTO: 0.02 K/UL
BASOPHILS NFR BLD AUTO: 0.3 %
BILIRUB SERPL-MCNC: 0.9 MG/DL
BILIRUBIN URINE: NEGATIVE
BLOOD URINE: NEGATIVE
BUN SERPL-MCNC: 13 MG/DL
C3 SERPL-MCNC: 107 MG/DL
C4 SERPL-MCNC: 17 MG/DL
CALCIUM SERPL-MCNC: 9.6 MG/DL
CHLORIDE SERPL-SCNC: 102 MMOL/L
CO2 SERPL-SCNC: 23 MMOL/L
COLOR: YELLOW
CREAT SERPL-MCNC: 0.57 MG/DL
CREAT SPEC-SCNC: 117 MG/DL
CREAT/PROT UR: 0.1 RATIO
DSDNA AB SER-ACNC: <12 IU/ML
EOSINOPHIL # BLD AUTO: 0.99 K/UL
EOSINOPHIL NFR BLD AUTO: 15.6 %
ERYTHROCYTE [SEDIMENTATION RATE] IN BLOOD BY WESTERGREN METHOD: 25 MM/HR
GLUCOSE QUALITATIVE U: NEGATIVE MG/DL
GLUCOSE SERPL-MCNC: 79 MG/DL
HCT VFR BLD CALC: 42.1 %
HGB BLD-MCNC: 13.3 G/DL
HYALINE CASTS: 2 /LPF
IMM GRANULOCYTES NFR BLD AUTO: 0.2 %
KETONES URINE: NEGATIVE
LEUKOCYTE ESTERASE URINE: ABNORMAL
LYMPHOCYTES # BLD AUTO: 2.36 K/UL
LYMPHOCYTES NFR BLD AUTO: 37.1 %
MAN DIFF?: NORMAL
MCHC RBC-ENTMCNC: 28.9 PG
MCHC RBC-ENTMCNC: 31.6 GM/DL
MCV RBC AUTO: 91.3 FL
MICROSCOPIC-UA: NORMAL
MONOCYTES # BLD AUTO: 0.48 K/UL
MONOCYTES NFR BLD AUTO: 7.5 %
NEUTROPHILS # BLD AUTO: 2.5 K/UL
NEUTROPHILS NFR BLD AUTO: 39.3 %
NITRITE URINE: NEGATIVE
PH URINE: 5.5
PLATELET # BLD AUTO: 186 K/UL
POTASSIUM SERPL-SCNC: 4.3 MMOL/L
PROT SERPL-MCNC: 7.4 G/DL
PROT UR-MCNC: 9 MG/DL
PROTEIN URINE: NEGATIVE MG/DL
RBC # BLD: 4.61 M/UL
RBC # FLD: 13.7 %
RED BLOOD CELLS URINE: 2 /HPF
SODIUM SERPL-SCNC: 139 MMOL/L
SPECIFIC GRAVITY URINE: 1.02
SQUAMOUS EPITHELIAL CELLS: 11 /HPF
UROBILINOGEN URINE: NEGATIVE MG/DL
WBC # FLD AUTO: 6.36 K/UL
WHITE BLOOD CELLS URINE: 16 /HPF

## 2018-05-17 ENCOUNTER — TRANSCRIPTION ENCOUNTER (OUTPATIENT)
Age: 38
End: 2018-05-17

## 2018-05-24 ENCOUNTER — APPOINTMENT (OUTPATIENT)
Dept: INTERNAL MEDICINE | Facility: CLINIC | Age: 38
End: 2018-05-24
Payer: COMMERCIAL

## 2018-05-24 VITALS
RESPIRATION RATE: 16 BRPM | HEART RATE: 80 BPM | TEMPERATURE: 98 F | DIASTOLIC BLOOD PRESSURE: 79 MMHG | SYSTOLIC BLOOD PRESSURE: 116 MMHG | OXYGEN SATURATION: 99 % | HEIGHT: 64 IN | WEIGHT: 171 LBS | BODY MASS INDEX: 29.19 KG/M2

## 2018-05-24 DIAGNOSIS — J34.89 OTHER SPECIFIED DISORDERS OF NOSE AND NASAL SINUSES: ICD-10-CM

## 2018-05-24 PROCEDURE — 99214 OFFICE O/P EST MOD 30 MIN: CPT

## 2018-05-24 RX ORDER — AZITHROMYCIN 250 MG/1
250 TABLET, FILM COATED ORAL
Qty: 1 | Refills: 0 | Status: COMPLETED | COMMUNITY
Start: 2018-05-24 | End: 2018-05-25

## 2018-05-24 RX ORDER — ESOMEPRAZOLE MAGNESIUM 40 MG/1
40 CAPSULE, DELAYED RELEASE ORAL DAILY
Qty: 90 | Refills: 0 | Status: DISCONTINUED | COMMUNITY
Start: 2017-03-03 | End: 2018-05-24

## 2018-05-24 RX ORDER — AZATHIOPRINE 50 1/1
50 TABLET ORAL
Qty: 30 | Refills: 1 | Status: DISCONTINUED | COMMUNITY
Start: 2018-05-11 | End: 2018-05-24

## 2018-06-11 ENCOUNTER — APPOINTMENT (OUTPATIENT)
Dept: INTERNAL MEDICINE | Facility: CLINIC | Age: 38
End: 2018-06-11

## 2018-07-16 ENCOUNTER — APPOINTMENT (OUTPATIENT)
Dept: RHEUMATOLOGY | Facility: CLINIC | Age: 38
End: 2018-07-16

## 2018-07-16 PROBLEM — M19.90 UNSPECIFIED OSTEOARTHRITIS, UNSPECIFIED SITE: Chronic | Status: ACTIVE | Noted: 2017-05-16

## 2018-07-17 PROBLEM — S92.309A FRACTURE OF UNSPECIFIED METATARSAL BONE(S), UNSPECIFIED FOOT, INITIAL ENCOUNTER FOR CLOSED FRACTURE: Chronic | Status: ACTIVE | Noted: 2017-05-16

## 2018-07-17 PROBLEM — I73.00 RAYNAUD'S SYNDROME WITHOUT GANGRENE: Chronic | Status: ACTIVE | Noted: 2017-05-16

## 2018-07-17 PROBLEM — L30.9 DERMATITIS, UNSPECIFIED: Chronic | Status: ACTIVE | Noted: 2017-05-16

## 2018-07-17 PROBLEM — R53.83 OTHER FATIGUE: Chronic | Status: ACTIVE | Noted: 2017-05-16

## 2018-07-17 PROBLEM — R76.8 OTHER SPECIFIED ABNORMAL IMMUNOLOGICAL FINDINGS IN SERUM: Chronic | Status: ACTIVE | Noted: 2017-05-16

## 2018-07-17 PROBLEM — J18.9 PNEUMONIA, UNSPECIFIED ORGANISM: Chronic | Status: ACTIVE | Noted: 2017-05-16

## 2018-07-20 ENCOUNTER — APPOINTMENT (OUTPATIENT)
Dept: RHEUMATOLOGY | Facility: CLINIC | Age: 38
End: 2018-07-20

## 2018-08-16 ENCOUNTER — APPOINTMENT (OUTPATIENT)
Dept: RHEUMATOLOGY | Facility: CLINIC | Age: 38
End: 2018-08-16
Payer: COMMERCIAL

## 2018-08-16 VITALS
HEART RATE: 68 BPM | RESPIRATION RATE: 16 BRPM | DIASTOLIC BLOOD PRESSURE: 59 MMHG | SYSTOLIC BLOOD PRESSURE: 99 MMHG | HEIGHT: 64 IN | TEMPERATURE: 98.4 F | OXYGEN SATURATION: 98 % | BODY MASS INDEX: 27.31 KG/M2 | WEIGHT: 160 LBS

## 2018-08-16 DIAGNOSIS — Z79.899 OTHER LONG TERM (CURRENT) DRUG THERAPY: ICD-10-CM

## 2018-08-16 DIAGNOSIS — M35.1 OTHER OVERLAP SYNDROMES: ICD-10-CM

## 2018-08-16 DIAGNOSIS — I73.00 RAYNAUD'S SYNDROME W/OUT GANGRENE: ICD-10-CM

## 2018-08-16 PROCEDURE — 99215 OFFICE O/P EST HI 40 MIN: CPT

## 2018-08-27 LAB
ALBUMIN SERPL ELPH-MCNC: 4.7 G/DL
ALP BLD-CCNC: 43 U/L
ALT SERPL-CCNC: 17 U/L
ANION GAP SERPL CALC-SCNC: 16 MMOL/L
APPEARANCE: CLEAR
AST SERPL-CCNC: 18 U/L
BACTERIA: NEGATIVE
BASOPHILS # BLD AUTO: 0.02 K/UL
BASOPHILS NFR BLD AUTO: 0.4 %
BILIRUB SERPL-MCNC: 0.9 MG/DL
BILIRUBIN URINE: NEGATIVE
BLOOD URINE: NEGATIVE
BUN SERPL-MCNC: 10 MG/DL
C3 SERPL-MCNC: 102 MG/DL
C4 SERPL-MCNC: 19 MG/DL
CALCIUM SERPL-MCNC: 9.4 MG/DL
CHLORIDE SERPL-SCNC: 101 MMOL/L
CO2 SERPL-SCNC: 22 MMOL/L
COLOR: YELLOW
CREAT SERPL-MCNC: 0.55 MG/DL
CREAT SPEC-SCNC: 17 MG/DL
CREAT/PROT UR: NORMAL
DSDNA AB SER-ACNC: <12 IU/ML
EOSINOPHIL # BLD AUTO: 0.18 K/UL
EOSINOPHIL NFR BLD AUTO: 3.8 %
ERYTHROCYTE [SEDIMENTATION RATE] IN BLOOD BY WESTERGREN METHOD: 20 MM/HR
GLUCOSE QUALITATIVE U: NEGATIVE MG/DL
GLUCOSE SERPL-MCNC: 85 MG/DL
HCT VFR BLD CALC: 43.1 %
HGB BLD-MCNC: 13.5 G/DL
HYALINE CASTS: 0 /LPF
IMM GRANULOCYTES NFR BLD AUTO: 0.2 %
KETONES URINE: NEGATIVE
LEUKOCYTE ESTERASE URINE: NEGATIVE
LYMPHOCYTES # BLD AUTO: 1.79 K/UL
LYMPHOCYTES NFR BLD AUTO: 37.7 %
MAN DIFF?: NORMAL
MCHC RBC-ENTMCNC: 28.9 PG
MCHC RBC-ENTMCNC: 31.3 GM/DL
MCV RBC AUTO: 92.3 FL
MICROSCOPIC-UA: NORMAL
MONOCYTES # BLD AUTO: 0.37 K/UL
MONOCYTES NFR BLD AUTO: 7.8 %
NEUTROPHILS # BLD AUTO: 2.38 K/UL
NEUTROPHILS NFR BLD AUTO: 50.1 %
NITRITE URINE: NEGATIVE
PH URINE: 7
PLATELET # BLD AUTO: 172 K/UL
POTASSIUM SERPL-SCNC: 4.5 MMOL/L
PROT SERPL-MCNC: 7.3 G/DL
PROT UR-MCNC: <4 MG/DL
PROTEIN URINE: NEGATIVE MG/DL
RBC # BLD: 4.67 M/UL
RBC # FLD: 13.7 %
RED BLOOD CELLS URINE: 0 /HPF
SODIUM SERPL-SCNC: 139 MMOL/L
SPECIFIC GRAVITY URINE: 1.01
SQUAMOUS EPITHELIAL CELLS: 0 /HPF
UROBILINOGEN URINE: NEGATIVE MG/DL
WBC # FLD AUTO: 4.75 K/UL
WHITE BLOOD CELLS URINE: 0 /HPF

## 2018-10-31 ENCOUNTER — RESULT REVIEW (OUTPATIENT)
Age: 38
End: 2018-10-31

## 2019-01-02 ENCOUNTER — APPOINTMENT (OUTPATIENT)
Dept: RHEUMATOLOGY | Facility: CLINIC | Age: 39
End: 2019-01-02

## 2019-03-18 ENCOUNTER — APPOINTMENT (OUTPATIENT)
Dept: INTERNAL MEDICINE | Facility: CLINIC | Age: 39
End: 2019-03-18
Payer: COMMERCIAL

## 2019-03-18 ENCOUNTER — LABORATORY RESULT (OUTPATIENT)
Age: 39
End: 2019-03-18

## 2019-03-18 VITALS
OXYGEN SATURATION: 99 % | WEIGHT: 152 LBS | TEMPERATURE: 98.9 F | RESPIRATION RATE: 17 BRPM | SYSTOLIC BLOOD PRESSURE: 100 MMHG | HEART RATE: 93 BPM | BODY MASS INDEX: 25.95 KG/M2 | DIASTOLIC BLOOD PRESSURE: 67 MMHG | HEIGHT: 64 IN

## 2019-03-18 DIAGNOSIS — Z87.19 PERSONAL HISTORY OF OTHER DISEASES OF THE DIGESTIVE SYSTEM: ICD-10-CM

## 2019-03-18 DIAGNOSIS — R25.2 CRAMP AND SPASM: ICD-10-CM

## 2019-03-18 DIAGNOSIS — R53.83 OTHER FATIGUE: ICD-10-CM

## 2019-03-18 DIAGNOSIS — Z87.2 PERSONAL HISTORY OF DISEASES OF THE SKIN AND SUBCUTANEOUS TISSUE: ICD-10-CM

## 2019-03-18 DIAGNOSIS — R09.81 NASAL CONGESTION: ICD-10-CM

## 2019-03-18 DIAGNOSIS — Z00.00 ENCOUNTER FOR GENERAL ADULT MEDICAL EXAMINATION W/OUT ABNORMAL FINDINGS: ICD-10-CM

## 2019-03-18 PROCEDURE — 99214 OFFICE O/P EST MOD 30 MIN: CPT

## 2019-03-18 RX ORDER — AZATHIOPRINE 50 1/1
50 TABLET ORAL DAILY
Qty: 90 | Refills: 1 | Status: DISCONTINUED | COMMUNITY
Start: 2017-10-30 | End: 2019-03-18

## 2019-03-18 RX ORDER — OSELTAMIVIR PHOSPHATE 75 MG/1
75 CAPSULE ORAL TWICE DAILY
Qty: 10 | Refills: 0 | Status: COMPLETED | COMMUNITY
Start: 2019-03-18 | End: 2019-03-23

## 2019-05-15 ENCOUNTER — APPOINTMENT (OUTPATIENT)
Dept: INTERNAL MEDICINE | Facility: CLINIC | Age: 39
End: 2019-05-15

## 2019-06-03 ENCOUNTER — TRANSCRIPTION ENCOUNTER (OUTPATIENT)
Age: 39
End: 2019-06-03

## 2019-06-29 NOTE — H&P PST ADULT - PRO INTERPRETER NEED 2
Continue to take the Phenergan prescribed by Obstetrics    Get Macrobid prescription filled and complete full course   English

## 2020-03-04 NOTE — H&P PST ADULT - DOCUMENT STATUS
Patient Specific Counseling (Will Not Stick From Patient To Patient): Symptoms significantly improved today.\\nResults of pathology discussed. Continue prednisone taper until gone. Advised to use Tylenol alternating with ibuprofen for pain, taken with food. Labs to be discussed on Monday. These were unremarkable save for slight elevated CRP and glucose slightly elevated at 133. Hepatitis B & hepatitis C serologies negavie. Connective tissue screen, ESR, EMELYN, RF, anti-MPO & anti AK-3 antibodies are all negative.\\nRecommend leg elevation as much as possible and addition of an OTC NSAID to her acetominophen for pain control. Detail Level: Detailed Authored by Resident/PA/NP

## 2020-03-09 ENCOUNTER — RESULT REVIEW (OUTPATIENT)
Age: 40
End: 2020-03-09

## 2020-03-23 ENCOUNTER — TRANSCRIPTION ENCOUNTER (OUTPATIENT)
Age: 40
End: 2020-03-23

## 2020-03-25 ENCOUNTER — LABORATORY RESULT (OUTPATIENT)
Age: 40
End: 2020-03-25

## 2020-03-25 ENCOUNTER — APPOINTMENT (OUTPATIENT)
Dept: RHEUMATOLOGY | Facility: CLINIC | Age: 40
End: 2020-03-25
Payer: COMMERCIAL

## 2020-03-25 VITALS
TEMPERATURE: 98.7 F | SYSTOLIC BLOOD PRESSURE: 117 MMHG | BODY MASS INDEX: 26.12 KG/M2 | OXYGEN SATURATION: 99 % | HEART RATE: 88 BPM | DIASTOLIC BLOOD PRESSURE: 71 MMHG | RESPIRATION RATE: 16 BRPM | WEIGHT: 153 LBS | HEIGHT: 64 IN

## 2020-03-25 DIAGNOSIS — M25.50 PAIN IN UNSPECIFIED JOINT: ICD-10-CM

## 2020-03-25 DIAGNOSIS — R76.8 OTHER SPECIFIED ABNORMAL IMMUNOLOGICAL FINDINGS IN SERUM: ICD-10-CM

## 2020-03-25 PROCEDURE — 99214 OFFICE O/P EST MOD 30 MIN: CPT

## 2020-03-27 LAB
25(OH)D3 SERPL-MCNC: 27.4 NG/ML
ALBUMIN SERPL ELPH-MCNC: 4.2 G/DL
ALP BLD-CCNC: 33 U/L
ALT SERPL-CCNC: 12 U/L
ANION GAP SERPL CALC-SCNC: 13 MMOL/L
APPEARANCE: CLEAR
AST SERPL-CCNC: 14 U/L
BACTERIA: NEGATIVE
BASOPHILS # BLD AUTO: 0.02 K/UL
BASOPHILS NFR BLD AUTO: 0.2 %
BILIRUB SERPL-MCNC: 0.5 MG/DL
BILIRUBIN URINE: NEGATIVE
BLOOD URINE: NEGATIVE
BUN SERPL-MCNC: 11 MG/DL
C3 SERPL-MCNC: 119 MG/DL
C4 SERPL-MCNC: 18 MG/DL
CALCIUM OXALATE CRYSTALS: ABNORMAL
CALCIUM SERPL-MCNC: 9.4 MG/DL
CCP AB SER IA-ACNC: <8 UNITS
CHLORIDE SERPL-SCNC: 103 MMOL/L
CK SERPL-CCNC: 42 U/L
CO2 SERPL-SCNC: 20 MMOL/L
COLOR: YELLOW
CONFIRM: 27.5 SEC
CREAT SERPL-MCNC: 0.35 MG/DL
CREAT SPEC-SCNC: 144 MG/DL
CREAT/PROT UR: 0.1 RATIO
CRP SERPL-MCNC: 0.98 MG/DL
DRVVT IMM 1:2 NP PPP: NORMAL
DRVVT SCREEN TO CONFIRM RATIO: 0.92 RATIO
DSDNA AB SER-ACNC: <12 IU/ML
ENA RNP AB SER IA-ACNC: 0.3 AL
ENA SM AB SER IA-ACNC: <0.2 AL
ENA SS-A AB SER IA-ACNC: <0.2 AL
ENA SS-B AB SER IA-ACNC: <0.2 AL
EOSINOPHIL # BLD AUTO: 0.09 K/UL
EOSINOPHIL NFR BLD AUTO: 1.1 %
GLUCOSE QUALITATIVE U: NEGATIVE
GLUCOSE SERPL-MCNC: 90 MG/DL
HCT VFR BLD CALC: 40.2 %
HGB BLD-MCNC: 13.4 G/DL
HYALINE CASTS: 0 /LPF
IMM GRANULOCYTES NFR BLD AUTO: 0.4 %
KETONES URINE: NORMAL
LEUKOCYTE ESTERASE URINE: NEGATIVE
LUPUS ANTICOAGULANT CASCADE REFLEX: NORMAL
LYMPHOCYTES # BLD AUTO: 2.29 K/UL
LYMPHOCYTES NFR BLD AUTO: 27.9 %
MAN DIFF?: NORMAL
MCHC RBC-ENTMCNC: 30 PG
MCHC RBC-ENTMCNC: 33.3 GM/DL
MCV RBC AUTO: 90.1 FL
MICROSCOPIC-UA: NORMAL
MONOCYTES # BLD AUTO: 0.45 K/UL
MONOCYTES NFR BLD AUTO: 5.5 %
NEUTROPHILS # BLD AUTO: 5.33 K/UL
NEUTROPHILS NFR BLD AUTO: 64.9 %
NITRITE URINE: NEGATIVE
PH URINE: 5.5
PLATELET # BLD AUTO: 200 K/UL
POTASSIUM SERPL-SCNC: 3.9 MMOL/L
PROT SERPL-MCNC: 7 G/DL
PROT UR-MCNC: 17 MG/DL
PROTEIN URINE: NORMAL
RBC # BLD: 4.46 M/UL
RBC # FLD: 13.1 %
RED BLOOD CELLS URINE: 1 /HPF
RF+CCP IGG SER-IMP: NEGATIVE
SCREEN DRVVT: 28 SEC
SODIUM SERPL-SCNC: 136 MMOL/L
SPECIFIC GRAVITY URINE: 1.04
SQUAMOUS EPITHELIAL CELLS: 25 /HPF
THYROGLOB AB SERPL-ACNC: <20 IU/ML
THYROPEROXIDASE AB SERPL IA-ACNC: <10 IU/ML
UROBILINOGEN URINE: NORMAL
WBC # FLD AUTO: 8.21 K/UL
WHITE BLOOD CELLS URINE: 1 /HPF

## 2020-03-30 ENCOUNTER — APPOINTMENT (OUTPATIENT)
Dept: ANTEPARTUM | Facility: CLINIC | Age: 40
End: 2020-03-30
Payer: COMMERCIAL

## 2020-03-30 ENCOUNTER — ASOB RESULT (OUTPATIENT)
Age: 40
End: 2020-03-30

## 2020-03-30 PROCEDURE — 76813 OB US NUCHAL MEAS 1 GEST: CPT

## 2020-03-30 PROCEDURE — 36416 COLLJ CAPILLARY BLOOD SPEC: CPT

## 2020-03-30 PROCEDURE — 76801 OB US < 14 WKS SINGLE FETUS: CPT

## 2020-03-31 DIAGNOSIS — M32.9 SYSTEMIC LUPUS ERYTHEMATOSUS, UNSPECIFIED: ICD-10-CM

## 2020-04-09 ENCOUNTER — APPOINTMENT (OUTPATIENT)
Dept: MATERNAL FETAL MEDICINE | Facility: CLINIC | Age: 40
End: 2020-04-09
Payer: COMMERCIAL

## 2020-04-09 PROCEDURE — 99203 OFFICE O/P NEW LOW 30 MIN: CPT | Mod: 95

## 2020-05-22 ENCOUNTER — APPOINTMENT (OUTPATIENT)
Dept: ANTEPARTUM | Facility: CLINIC | Age: 40
End: 2020-05-22
Payer: COMMERCIAL

## 2020-05-22 ENCOUNTER — ASOB RESULT (OUTPATIENT)
Age: 40
End: 2020-05-22

## 2020-05-22 PROCEDURE — 76811 OB US DETAILED SNGL FETUS: CPT

## 2020-06-09 ENCOUNTER — NON-APPOINTMENT (OUTPATIENT)
Age: 40
End: 2020-06-09

## 2020-06-09 ENCOUNTER — APPOINTMENT (OUTPATIENT)
Dept: OBGYN | Facility: CLINIC | Age: 40
End: 2020-06-09
Payer: COMMERCIAL

## 2020-06-09 VITALS
BODY MASS INDEX: 31.07 KG/M2 | HEIGHT: 64 IN | WEIGHT: 182 LBS | DIASTOLIC BLOOD PRESSURE: 62 MMHG | SYSTOLIC BLOOD PRESSURE: 98 MMHG

## 2020-06-09 VITALS — SYSTOLIC BLOOD PRESSURE: 98 MMHG | BODY MASS INDEX: 31.24 KG/M2 | WEIGHT: 182 LBS | DIASTOLIC BLOOD PRESSURE: 62 MMHG

## 2020-06-09 DIAGNOSIS — R50.9 FEVER, UNSPECIFIED: ICD-10-CM

## 2020-06-09 DIAGNOSIS — Z87.39 PERSONAL HISTORY OF OTHER DISEASES OF THE MUSCULOSKELETAL SYSTEM AND CONNECTIVE TISSUE: ICD-10-CM

## 2020-06-09 DIAGNOSIS — R20.0 ANESTHESIA OF SKIN: ICD-10-CM

## 2020-06-09 DIAGNOSIS — Z92.29 PERSONAL HISTORY OF OTHER DRUG THERAPY: ICD-10-CM

## 2020-06-09 DIAGNOSIS — Z87.09 PERSONAL HISTORY OF OTHER DISEASES OF THE RESPIRATORY SYSTEM: ICD-10-CM

## 2020-06-09 DIAGNOSIS — Z86.19 PERSONAL HISTORY OF OTHER INFECTIOUS AND PARASITIC DISEASES: ICD-10-CM

## 2020-06-09 DIAGNOSIS — R65.21 PERSONAL HISTORY OF OTHER INFECTIOUS AND PARASITIC DISEASES: ICD-10-CM

## 2020-06-09 DIAGNOSIS — R20.2 ANESTHESIA OF SKIN: ICD-10-CM

## 2020-06-09 DIAGNOSIS — J18.9 PNEUMONIA, UNSPECIFIED ORGANISM: ICD-10-CM

## 2020-06-09 PROCEDURE — 59425 ANTEPARTUM CARE ONLY: CPT

## 2020-06-09 PROCEDURE — 0501F PRENATAL FLOW SHEET: CPT

## 2020-06-09 RX ORDER — HYDROXYCHLOROQUINE SULFATE 200 MG/1
200 TABLET ORAL
Refills: 0 | Status: ACTIVE | COMMUNITY

## 2020-06-09 RX ORDER — FLUTICASONE PROPIONATE 50 UG/1
50 SPRAY, METERED NASAL TWICE DAILY
Qty: 1 | Refills: 1 | Status: DISCONTINUED | COMMUNITY
Start: 2018-05-24 | End: 2020-06-09

## 2020-06-09 RX ORDER — HYDROXYCHLOROQUINE SULFATE 200 MG/1
200 TABLET, FILM COATED ORAL
Qty: 90 | Refills: 3 | Status: DISCONTINUED | COMMUNITY
Start: 2017-10-24 | End: 2020-06-09

## 2020-06-10 LAB
ABO + RH PNL BLD: NORMAL
BASOPHILS # BLD AUTO: 0.02 K/UL
BASOPHILS NFR BLD AUTO: 0.3 %
BLD GP AB SCN SERPL QL: NORMAL
EOSINOPHIL # BLD AUTO: 0.05 K/UL
EOSINOPHIL NFR BLD AUTO: 0.7 %
GLUCOSE 1H P 50 G GLC PO SERPL-MCNC: 84 MG/DL
HBV SURFACE AG SER QL: NONREACTIVE
HCT VFR BLD CALC: 36.9 %
HGB BLD-MCNC: 11.7 G/DL
HIV1+2 AB SPEC QL IA.RAPID: NONREACTIVE
IMM GRANULOCYTES NFR BLD AUTO: 0.1 %
LYMPHOCYTES # BLD AUTO: 1.58 K/UL
LYMPHOCYTES NFR BLD AUTO: 23.6 %
MAN DIFF?: NORMAL
MCHC RBC-ENTMCNC: 30.2 PG
MCHC RBC-ENTMCNC: 31.7 GM/DL
MCV RBC AUTO: 95.1 FL
MONOCYTES # BLD AUTO: 0.41 K/UL
MONOCYTES NFR BLD AUTO: 6.1 %
NEUTROPHILS # BLD AUTO: 4.62 K/UL
NEUTROPHILS NFR BLD AUTO: 69.2 %
PLATELET # BLD AUTO: 193 K/UL
RBC # BLD: 3.88 M/UL
RBC # FLD: 13.2 %
T PALLIDUM AB SER QL IA: NEGATIVE
WBC # FLD AUTO: 6.69 K/UL

## 2020-06-19 ENCOUNTER — ASOB RESULT (OUTPATIENT)
Age: 40
End: 2020-06-19

## 2020-06-19 ENCOUNTER — APPOINTMENT (OUTPATIENT)
Dept: ANTEPARTUM | Facility: CLINIC | Age: 40
End: 2020-06-19
Payer: COMMERCIAL

## 2020-06-19 PROCEDURE — 76816 OB US FOLLOW-UP PER FETUS: CPT

## 2020-07-14 ENCOUNTER — APPOINTMENT (OUTPATIENT)
Dept: OBGYN | Facility: CLINIC | Age: 40
End: 2020-07-14
Payer: COMMERCIAL

## 2020-07-14 VITALS
HEIGHT: 64 IN | SYSTOLIC BLOOD PRESSURE: 104 MMHG | DIASTOLIC BLOOD PRESSURE: 70 MMHG | WEIGHT: 180 LBS | BODY MASS INDEX: 30.73 KG/M2

## 2020-07-14 PROCEDURE — 0502F SUBSEQUENT PRENATAL CARE: CPT

## 2020-07-17 ENCOUNTER — APPOINTMENT (OUTPATIENT)
Dept: RHEUMATOLOGY | Facility: CLINIC | Age: 40
End: 2020-07-17

## 2020-07-31 ENCOUNTER — ASOB RESULT (OUTPATIENT)
Age: 40
End: 2020-07-31

## 2020-07-31 ENCOUNTER — APPOINTMENT (OUTPATIENT)
Dept: ANTEPARTUM | Facility: CLINIC | Age: 40
End: 2020-07-31
Payer: COMMERCIAL

## 2020-07-31 PROCEDURE — 76816 OB US FOLLOW-UP PER FETUS: CPT

## 2020-07-31 PROCEDURE — 76819 FETAL BIOPHYS PROFIL W/O NST: CPT

## 2020-08-11 ENCOUNTER — APPOINTMENT (OUTPATIENT)
Dept: ANTEPARTUM | Facility: CLINIC | Age: 40
End: 2020-08-11
Payer: COMMERCIAL

## 2020-08-11 ENCOUNTER — APPOINTMENT (OUTPATIENT)
Dept: ANTEPARTUM | Facility: CLINIC | Age: 40
End: 2020-08-11

## 2020-08-11 ENCOUNTER — ASOB RESULT (OUTPATIENT)
Age: 40
End: 2020-08-11

## 2020-08-11 ENCOUNTER — APPOINTMENT (OUTPATIENT)
Dept: OBGYN | Facility: CLINIC | Age: 40
End: 2020-08-11
Payer: COMMERCIAL

## 2020-08-11 VITALS
BODY MASS INDEX: 31.41 KG/M2 | SYSTOLIC BLOOD PRESSURE: 110 MMHG | DIASTOLIC BLOOD PRESSURE: 68 MMHG | HEIGHT: 64 IN | WEIGHT: 184 LBS

## 2020-08-11 PROCEDURE — 76818 FETAL BIOPHYS PROFILE W/NST: CPT

## 2020-08-11 PROCEDURE — 0502F SUBSEQUENT PRENATAL CARE: CPT

## 2020-08-19 ENCOUNTER — APPOINTMENT (OUTPATIENT)
Dept: ANTEPARTUM | Facility: CLINIC | Age: 40
End: 2020-08-19
Payer: COMMERCIAL

## 2020-08-19 ENCOUNTER — ASOB RESULT (OUTPATIENT)
Age: 40
End: 2020-08-19

## 2020-08-19 PROCEDURE — 76818 FETAL BIOPHYS PROFILE W/NST: CPT

## 2020-08-28 ENCOUNTER — APPOINTMENT (OUTPATIENT)
Dept: OBGYN | Facility: CLINIC | Age: 40
End: 2020-08-28
Payer: COMMERCIAL

## 2020-08-28 ENCOUNTER — ASOB RESULT (OUTPATIENT)
Age: 40
End: 2020-08-28

## 2020-08-28 ENCOUNTER — APPOINTMENT (OUTPATIENT)
Dept: ANTEPARTUM | Facility: CLINIC | Age: 40
End: 2020-08-28

## 2020-08-28 ENCOUNTER — APPOINTMENT (OUTPATIENT)
Dept: ANTEPARTUM | Facility: CLINIC | Age: 40
End: 2020-08-28
Payer: COMMERCIAL

## 2020-08-28 VITALS
DIASTOLIC BLOOD PRESSURE: 68 MMHG | SYSTOLIC BLOOD PRESSURE: 112 MMHG | WEIGHT: 183 LBS | HEIGHT: 64 IN | BODY MASS INDEX: 31.24 KG/M2

## 2020-08-28 PROCEDURE — 90471 IMMUNIZATION ADMIN: CPT

## 2020-08-28 PROCEDURE — 76818 FETAL BIOPHYS PROFILE W/NST: CPT

## 2020-08-28 PROCEDURE — 76816 OB US FOLLOW-UP PER FETUS: CPT

## 2020-08-28 PROCEDURE — 90715 TDAP VACCINE 7 YRS/> IM: CPT

## 2020-08-28 PROCEDURE — 0502F SUBSEQUENT PRENATAL CARE: CPT

## 2020-09-02 ENCOUNTER — APPOINTMENT (OUTPATIENT)
Dept: ANTEPARTUM | Facility: CLINIC | Age: 40
End: 2020-09-02
Payer: COMMERCIAL

## 2020-09-02 ENCOUNTER — ASOB RESULT (OUTPATIENT)
Age: 40
End: 2020-09-02

## 2020-09-02 PROCEDURE — 76818 FETAL BIOPHYS PROFILE W/NST: CPT

## 2020-09-08 ENCOUNTER — APPOINTMENT (OUTPATIENT)
Dept: ANTEPARTUM | Facility: CLINIC | Age: 40
End: 2020-09-08
Payer: COMMERCIAL

## 2020-09-08 ENCOUNTER — ASOB RESULT (OUTPATIENT)
Age: 40
End: 2020-09-08

## 2020-09-08 ENCOUNTER — APPOINTMENT (OUTPATIENT)
Dept: OBGYN | Facility: CLINIC | Age: 40
End: 2020-09-08
Payer: COMMERCIAL

## 2020-09-08 ENCOUNTER — NON-APPOINTMENT (OUTPATIENT)
Age: 40
End: 2020-09-08

## 2020-09-08 VITALS
HEIGHT: 64 IN | BODY MASS INDEX: 31.76 KG/M2 | WEIGHT: 186 LBS | DIASTOLIC BLOOD PRESSURE: 68 MMHG | SYSTOLIC BLOOD PRESSURE: 109 MMHG

## 2020-09-08 PROCEDURE — 76818 FETAL BIOPHYS PROFILE W/NST: CPT

## 2020-09-08 PROCEDURE — 81003 URINALYSIS AUTO W/O SCOPE: CPT | Mod: QW

## 2020-09-08 PROCEDURE — 0502F SUBSEQUENT PRENATAL CARE: CPT

## 2020-09-15 ENCOUNTER — ASOB RESULT (OUTPATIENT)
Age: 40
End: 2020-09-15

## 2020-09-15 ENCOUNTER — APPOINTMENT (OUTPATIENT)
Dept: ANTEPARTUM | Facility: CLINIC | Age: 40
End: 2020-09-15
Payer: COMMERCIAL

## 2020-09-15 PROCEDURE — 76818 FETAL BIOPHYS PROFILE W/NST: CPT

## 2020-09-18 ENCOUNTER — APPOINTMENT (OUTPATIENT)
Dept: OBGYN | Facility: CLINIC | Age: 40
End: 2020-09-18
Payer: COMMERCIAL

## 2020-09-18 ENCOUNTER — APPOINTMENT (OUTPATIENT)
Dept: ANTEPARTUM | Facility: CLINIC | Age: 40
End: 2020-09-18
Payer: COMMERCIAL

## 2020-09-18 ENCOUNTER — ASOB RESULT (OUTPATIENT)
Age: 40
End: 2020-09-18

## 2020-09-18 VITALS
SYSTOLIC BLOOD PRESSURE: 104 MMHG | BODY MASS INDEX: 31.76 KG/M2 | WEIGHT: 186 LBS | HEIGHT: 64 IN | DIASTOLIC BLOOD PRESSURE: 76 MMHG

## 2020-09-18 PROCEDURE — 76819 FETAL BIOPHYS PROFIL W/O NST: CPT

## 2020-09-18 PROCEDURE — 76816 OB US FOLLOW-UP PER FETUS: CPT

## 2020-09-18 PROCEDURE — 0502F SUBSEQUENT PRENATAL CARE: CPT

## 2020-09-19 ENCOUNTER — LABORATORY RESULT (OUTPATIENT)
Age: 40
End: 2020-09-19

## 2020-09-22 ENCOUNTER — APPOINTMENT (OUTPATIENT)
Dept: OBGYN | Facility: CLINIC | Age: 40
End: 2020-09-22

## 2020-09-22 ENCOUNTER — APPOINTMENT (OUTPATIENT)
Dept: ANTEPARTUM | Facility: CLINIC | Age: 40
End: 2020-09-22

## 2020-09-24 ENCOUNTER — INPATIENT (INPATIENT)
Facility: HOSPITAL | Age: 40
LOS: 1 days | Discharge: HOME | End: 2020-09-26
Attending: OBSTETRICS & GYNECOLOGY | Admitting: OBSTETRICS & GYNECOLOGY
Payer: COMMERCIAL

## 2020-09-24 VITALS — HEIGHT: 64 IN | WEIGHT: 182.98 LBS

## 2020-09-24 DIAGNOSIS — N75.0 CYST OF BARTHOLIN'S GLAND: Chronic | ICD-10-CM

## 2020-09-24 DIAGNOSIS — M32.9 SYSTEMIC LUPUS ERYTHEMATOSUS, UNSPECIFIED: ICD-10-CM

## 2020-09-24 LAB
BASOPHILS # BLD AUTO: 0.03 K/UL — SIGNIFICANT CHANGE UP (ref 0–0.2)
BASOPHILS NFR BLD AUTO: 0.4 % — SIGNIFICANT CHANGE UP (ref 0–2)
BLD GP AB SCN SERPL QL: NEGATIVE — SIGNIFICANT CHANGE UP
EOSINOPHIL # BLD AUTO: 0.09 K/UL — SIGNIFICANT CHANGE UP (ref 0–0.5)
EOSINOPHIL NFR BLD AUTO: 1.2 % — SIGNIFICANT CHANGE UP (ref 0–6)
HCT VFR BLD CALC: 32.7 % — LOW (ref 34.5–45)
HGB BLD-MCNC: 10.2 G/DL — LOW (ref 11.5–15.5)
IMM GRANULOCYTES NFR BLD AUTO: 0.3 % — SIGNIFICANT CHANGE UP (ref 0–1.5)
LYMPHOCYTES # BLD AUTO: 2.49 K/UL — SIGNIFICANT CHANGE UP (ref 1–3.3)
LYMPHOCYTES # BLD AUTO: 32.4 % — SIGNIFICANT CHANGE UP (ref 13–44)
MCHC RBC-ENTMCNC: 26.2 PG — LOW (ref 27–34)
MCHC RBC-ENTMCNC: 31.2 GM/DL — LOW (ref 32–36)
MCV RBC AUTO: 84.1 FL — SIGNIFICANT CHANGE UP (ref 80–100)
MONOCYTES # BLD AUTO: 0.42 K/UL — SIGNIFICANT CHANGE UP (ref 0–0.9)
MONOCYTES NFR BLD AUTO: 5.5 % — SIGNIFICANT CHANGE UP (ref 2–14)
NEUTROPHILS # BLD AUTO: 4.64 K/UL — SIGNIFICANT CHANGE UP (ref 1.8–7.4)
NEUTROPHILS NFR BLD AUTO: 60.2 % — SIGNIFICANT CHANGE UP (ref 43–77)
NRBC # BLD: 0 /100 WBCS — SIGNIFICANT CHANGE UP (ref 0–0)
PLATELET # BLD AUTO: 183 K/UL — SIGNIFICANT CHANGE UP (ref 150–400)
RBC # BLD: 3.89 M/UL — SIGNIFICANT CHANGE UP (ref 3.8–5.2)
RBC # FLD: 13.9 % — SIGNIFICANT CHANGE UP (ref 10.3–14.5)
RH IG SCN BLD-IMP: POSITIVE — SIGNIFICANT CHANGE UP
SARS-COV-2 IGG SERPL QL IA: NEGATIVE — SIGNIFICANT CHANGE UP
SARS-COV-2 IGM SERPL IA-ACNC: 0.1 INDEX — SIGNIFICANT CHANGE UP
SARS-COV-2 N GENE NPH QL NAA+PROBE: NOT DETECTED
T PALLIDUM AB TITR SER: NEGATIVE — SIGNIFICANT CHANGE UP
WBC # BLD: 7.69 K/UL — SIGNIFICANT CHANGE UP (ref 3.8–10.5)
WBC # FLD AUTO: 7.69 K/UL — SIGNIFICANT CHANGE UP (ref 3.8–10.5)

## 2020-09-24 PROCEDURE — 59410 OBSTETRICAL CARE: CPT | Mod: U7

## 2020-09-24 RX ORDER — MAGNESIUM HYDROXIDE 400 MG/1
30 TABLET, CHEWABLE ORAL
Refills: 0 | Status: DISCONTINUED | OUTPATIENT
Start: 2020-09-24 | End: 2020-09-26

## 2020-09-24 RX ORDER — OXYTOCIN 10 UNIT/ML
333.33 VIAL (ML) INJECTION
Qty: 20 | Refills: 0 | Status: DISCONTINUED | OUTPATIENT
Start: 2020-09-24 | End: 2020-09-26

## 2020-09-24 RX ORDER — SODIUM CHLORIDE 9 MG/ML
1000 INJECTION, SOLUTION INTRAVENOUS
Refills: 0 | Status: DISCONTINUED | OUTPATIENT
Start: 2020-09-24 | End: 2020-09-25

## 2020-09-24 RX ORDER — SODIUM CHLORIDE 9 MG/ML
3 INJECTION INTRAMUSCULAR; INTRAVENOUS; SUBCUTANEOUS EVERY 8 HOURS
Refills: 0 | Status: DISCONTINUED | OUTPATIENT
Start: 2020-09-24 | End: 2020-09-26

## 2020-09-24 RX ORDER — HYDROCORTISONE 1 %
1 OINTMENT (GRAM) TOPICAL EVERY 6 HOURS
Refills: 0 | Status: DISCONTINUED | OUTPATIENT
Start: 2020-09-24 | End: 2020-09-26

## 2020-09-24 RX ORDER — PRAMOXINE HYDROCHLORIDE 150 MG/15G
1 AEROSOL, FOAM RECTAL EVERY 4 HOURS
Refills: 0 | Status: DISCONTINUED | OUTPATIENT
Start: 2020-09-24 | End: 2020-09-26

## 2020-09-24 RX ORDER — CITRIC ACID/SODIUM CITRATE 300-500 MG
15 SOLUTION, ORAL ORAL EVERY 6 HOURS
Refills: 0 | Status: DISCONTINUED | OUTPATIENT
Start: 2020-09-24 | End: 2020-09-25

## 2020-09-24 RX ORDER — OXYTOCIN 10 UNIT/ML
4 VIAL (ML) INJECTION
Qty: 30 | Refills: 0 | Status: DISCONTINUED | OUTPATIENT
Start: 2020-09-24 | End: 2020-09-26

## 2020-09-24 RX ORDER — AER TRAVELER 0.5 G/1
1 SOLUTION RECTAL; TOPICAL EVERY 4 HOURS
Refills: 0 | Status: DISCONTINUED | OUTPATIENT
Start: 2020-09-24 | End: 2020-09-26

## 2020-09-24 RX ORDER — DIPHENHYDRAMINE HCL 50 MG
25 CAPSULE ORAL EVERY 6 HOURS
Refills: 0 | Status: DISCONTINUED | OUTPATIENT
Start: 2020-09-24 | End: 2020-09-26

## 2020-09-24 RX ORDER — DIBUCAINE 1 %
1 OINTMENT (GRAM) RECTAL EVERY 6 HOURS
Refills: 0 | Status: DISCONTINUED | OUTPATIENT
Start: 2020-09-24 | End: 2020-09-26

## 2020-09-24 RX ORDER — SIMETHICONE 80 MG/1
80 TABLET, CHEWABLE ORAL EVERY 4 HOURS
Refills: 0 | Status: DISCONTINUED | OUTPATIENT
Start: 2020-09-24 | End: 2020-09-26

## 2020-09-24 RX ORDER — IBUPROFEN 200 MG
600 TABLET ORAL EVERY 6 HOURS
Refills: 0 | Status: COMPLETED | OUTPATIENT
Start: 2020-09-24 | End: 2021-08-23

## 2020-09-24 RX ORDER — BENZOCAINE 10 %
1 GEL (GRAM) MUCOUS MEMBRANE EVERY 6 HOURS
Refills: 0 | Status: DISCONTINUED | OUTPATIENT
Start: 2020-09-24 | End: 2020-09-26

## 2020-09-24 RX ORDER — AMLODIPINE BESYLATE 2.5 MG/1
1 TABLET ORAL
Qty: 0 | Refills: 0 | DISCHARGE

## 2020-09-24 RX ORDER — ACETAMINOPHEN 500 MG
975 TABLET ORAL
Refills: 0 | Status: DISCONTINUED | OUTPATIENT
Start: 2020-09-24 | End: 2020-09-26

## 2020-09-24 RX ORDER — KETOROLAC TROMETHAMINE 30 MG/ML
30 SYRINGE (ML) INJECTION ONCE
Refills: 0 | Status: DISCONTINUED | OUTPATIENT
Start: 2020-09-24 | End: 2020-09-24

## 2020-09-24 RX ORDER — OXYCODONE HYDROCHLORIDE 5 MG/1
5 TABLET ORAL
Refills: 0 | Status: DISCONTINUED | OUTPATIENT
Start: 2020-09-24 | End: 2020-09-26

## 2020-09-24 RX ORDER — LANOLIN
1 OINTMENT (GRAM) TOPICAL EVERY 6 HOURS
Refills: 0 | Status: DISCONTINUED | OUTPATIENT
Start: 2020-09-24 | End: 2020-09-26

## 2020-09-24 RX ORDER — TETANUS TOXOID, REDUCED DIPHTHERIA TOXOID AND ACELLULAR PERTUSSIS VACCINE, ADSORBED 5; 2.5; 8; 8; 2.5 [IU]/.5ML; [IU]/.5ML; UG/.5ML; UG/.5ML; UG/.5ML
0.5 SUSPENSION INTRAMUSCULAR ONCE
Refills: 0 | Status: DISCONTINUED | OUTPATIENT
Start: 2020-09-24 | End: 2020-09-26

## 2020-09-24 RX ORDER — OXYCODONE HYDROCHLORIDE 5 MG/1
5 TABLET ORAL ONCE
Refills: 0 | Status: DISCONTINUED | OUTPATIENT
Start: 2020-09-24 | End: 2020-09-26

## 2020-09-24 RX ADMIN — SODIUM CHLORIDE 125 MILLILITER(S): 9 INJECTION, SOLUTION INTRAVENOUS at 04:09

## 2020-09-24 RX ADMIN — Medication 30 MILLIGRAM(S): at 22:36

## 2020-09-24 RX ADMIN — Medication 4 MILLIUNIT(S)/MIN: at 18:06

## 2020-09-24 NOTE — PATIENT PROFILE OB - ALCOHOL USE HISTORY SINGLE SELECT
Transitions of Care Pharmacy Service   Medication Review    The patient's list of current home medications has been reviewed. Source(s) of information: patient, surescripts    Based on information provided by the above source(s), I have updated the patient's home med list as described below. I changed or updated the following medications on the patient's home medication list:  Discontinued Docusate 100mg/5ml - list cleanup  Estradiol 0.5mg - therapy completed  Furosemide 40mg - list cleanup  Guaifenesin 100mg/5ml - list cleanup   Ketoconazole 2% Cr - list cleanup  Miconazole 2% Powder - list cleanup  Nicotine 21mg/24hr - patient choice  Zofran ODT 4mg - list cleanup   Potassium Chloride 10mEq - list cleanup  Vitamin D 56630 - list cleanup      Added None      Adjusted   Clonazepam 1mg - changed sig from 1QD to 1TID  Gabapentin 400mg changed from 1-2BID to 1BID and 1QHS  Glimepiride 1mg - changed from 2QAM to 2QAM and 1QHS  Metoprolol Tartrate 25mg - changed from 1. 5BID to 1BID   Trazodone - added strength and fixed sig - 150mg - sig changed from QHS PRN to 2QD  Venlafaxine - added stength and fixed sig - ER 150mg - sig changed from 2BID to 1BID      Other Notes Metoprolol Tartrate 25mg - Patient states she still has medication at home and has been taking it BID as prescribed - last fill showed 04/21/20 for 30 d/s           Please feel free to call me with any questions about this encounter. Thank you. This note will be reviewed and co-signed by the Transitions of Care Pharmacist. The pharmacist will review inpatient orders and contact the physician about any discrepancies. Monica Medina, pharmacy technician  Transitions of Care Pharmacy Service  Phone:  [phone]  Fax: [phone]      Electronically signed by Monica Medina on 8/28/2020 at 2:15 PM     Prior to Admission medications    Medication Sig Start Date End Date Taking?  Authorizing Provider   clonazePAM (KLONOPIN) 1 MG tablet Take 1 mg by mouth 3 times daily. gabapentin (NEURONTIN) 400 MG capsule Take 400 mg by mouth nightly. glimepiride (AMARYL) 1 MG tablet Take 1 mg by mouth nightly In addition to 2 tablets (=2mg) every morning       metoprolol tartrate (LOPRESSOR) 25 MG tablet Take 25 mg by mouth 2 times daily       venlafaxine (EFFEXOR XR) 150 MG extended release capsule Take 150 mg by mouth 2 times daily       oxyCODONE-acetaminophen (PERCOCET) 5-325 MG per tablet Take 1 tablet by mouth 4 times daily as needed for Pain. traZODone (DESYREL) 150 MG tablet Take 300 mg by mouth nightly Take 2 tablets (=300mg) nightly       lansoprazole (PREVACID) 30 MG delayed release capsule Take 30 mg by mouth daily       metFORMIN (GLUCOPHAGE) 500 MG tablet Take 500 mg by mouth 2 times daily       aspirin EC 81 MG EC tablet Take 81 mg by mouth daily       gabapentin (NEURONTIN) 400 MG capsule Take 400 mg by mouth 2 times daily.  And 1 capsules nightly       glimepiride (AMARYL) 1 MG tablet Take 2 mg by mouth every morning In addition to 1mg nightly       mometasone-formoterol (DULERA) 200-5 MCG/ACT inhaler Inhale 2 puffs into the lungs every 12 hours never

## 2020-09-25 RX ORDER — IBUPROFEN 200 MG
600 TABLET ORAL EVERY 6 HOURS
Refills: 0 | Status: DISCONTINUED | OUTPATIENT
Start: 2020-09-25 | End: 2020-09-26

## 2020-09-25 RX ADMIN — Medication 975 MILLIGRAM(S): at 23:00

## 2020-09-25 RX ADMIN — Medication 975 MILLIGRAM(S): at 15:41

## 2020-09-25 RX ADMIN — Medication 600 MILLIGRAM(S): at 18:19

## 2020-09-25 RX ADMIN — Medication 600 MILLIGRAM(S): at 14:05

## 2020-09-25 RX ADMIN — Medication 975 MILLIGRAM(S): at 09:01

## 2020-09-25 RX ADMIN — Medication 975 MILLIGRAM(S): at 09:31

## 2020-09-25 RX ADMIN — Medication 600 MILLIGRAM(S): at 19:19

## 2020-09-25 RX ADMIN — Medication 975 MILLIGRAM(S): at 03:09

## 2020-09-25 RX ADMIN — Medication 1 TABLET(S): at 13:35

## 2020-09-25 RX ADMIN — Medication 600 MILLIGRAM(S): at 13:35

## 2020-09-25 RX ADMIN — Medication 975 MILLIGRAM(S): at 22:00

## 2020-09-25 NOTE — PROGRESS NOTE ADULT - SUBJECTIVE AND OBJECTIVE BOX
Postpartum Note- PPD#1    Allergies    No Known Drug Allergies  shellfish (Anaphylaxis)    Blood Type A   Positive    RPR : Negative    Rubella: Immune  S: Patient is a  40y    P      PPD#1         S/P     Patient w/o complaints, pain is controlled.    Pt is OOB, tolerating PO, passing flatus. Lochia WNL.     Feeding: Breast    O:  Vital Signs Last 24 Hrs  T(C): 36.4 (25 Sep 2020 04:45), Max: 36.7 (25 Sep 2020 00:05)  T(F): 97.5 (25 Sep 2020 04:45), Max: 98.1 (25 Sep 2020 00:05)  HR: 66 (25 Sep 2020 04:45) (63 - 89)  BP: 119/80 (25 Sep 2020 04:45) (100/62 - 119/80)  RR: 18 (25 Sep 2020 04:45) (18 - 18)  SpO2: 100% (25 Sep 2020 04:45) (98% - 100%)     Gen: NAD  Abdomen: Soft, nontender, non-distended, fundus firm.  Vaginal: Lochia WNL  Ext: Neg edema, Neg calf tenderness    LABS:    Hemoglobin: 10.2 g/dL ( @ 04:01)      Hematocrit: 32.7 % ( @ 04:01)

## 2020-09-25 NOTE — PROGRESS NOTE ADULT - ASSESSMENT
A/P:  40y  PPD # 1      S/P                      doing well      Current Issues: none  PAST MEDICAL & SURGICAL HISTORY:  Metatarsal fracture  Left foot,  5th metatarsal  Dermatitis  Pneumonia  with septic shock, 2016  Fatigue  VERA positive  Raynaud&#x27;s disease  Inflammatory arthritis  Bartholin&#x27;s cyst  removed,

## 2020-09-26 ENCOUNTER — TRANSCRIPTION ENCOUNTER (OUTPATIENT)
Age: 40
End: 2020-09-26

## 2020-09-26 VITALS
HEART RATE: 95 BPM | DIASTOLIC BLOOD PRESSURE: 60 MMHG | TEMPERATURE: 98 F | RESPIRATION RATE: 18 BRPM | SYSTOLIC BLOOD PRESSURE: 95 MMHG | OXYGEN SATURATION: 97 %

## 2020-09-26 PROCEDURE — 59025 FETAL NON-STRESS TEST: CPT

## 2020-09-26 PROCEDURE — 86900 BLOOD TYPING SEROLOGIC ABO: CPT

## 2020-09-26 PROCEDURE — 86901 BLOOD TYPING SEROLOGIC RH(D): CPT

## 2020-09-26 PROCEDURE — 59050 FETAL MONITOR W/REPORT: CPT

## 2020-09-26 PROCEDURE — 85025 COMPLETE CBC W/AUTO DIFF WBC: CPT

## 2020-09-26 PROCEDURE — 86850 RBC ANTIBODY SCREEN: CPT

## 2020-09-26 PROCEDURE — 86769 SARS-COV-2 COVID-19 ANTIBODY: CPT

## 2020-09-26 PROCEDURE — 86780 TREPONEMA PALLIDUM: CPT

## 2020-09-26 RX ORDER — IBUPROFEN 200 MG
1 TABLET ORAL
Qty: 0 | Refills: 0 | DISCHARGE
Start: 2020-09-26

## 2020-09-26 RX ORDER — ACETAMINOPHEN 500 MG
3 TABLET ORAL
Qty: 0 | Refills: 0 | DISCHARGE
Start: 2020-09-26

## 2020-09-26 RX ORDER — INFLUENZA VIRUS VACCINE 15; 15; 15; 15 UG/.5ML; UG/.5ML; UG/.5ML; UG/.5ML
0.5 SUSPENSION INTRAMUSCULAR ONCE
Refills: 0 | Status: DISCONTINUED | OUTPATIENT
Start: 2020-09-26 | End: 2020-09-26

## 2020-09-26 RX ORDER — FAMOTIDINE 10 MG/ML
0 INJECTION INTRAVENOUS
Qty: 0 | Refills: 0 | DISCHARGE

## 2020-09-26 RX ADMIN — Medication 600 MILLIGRAM(S): at 00:14

## 2020-09-26 RX ADMIN — Medication 600 MILLIGRAM(S): at 06:01

## 2020-09-26 RX ADMIN — Medication 1 TABLET(S): at 12:19

## 2020-09-26 RX ADMIN — Medication 600 MILLIGRAM(S): at 01:14

## 2020-09-26 RX ADMIN — Medication 600 MILLIGRAM(S): at 12:18

## 2020-09-26 RX ADMIN — Medication 600 MILLIGRAM(S): at 07:01

## 2020-09-26 RX ADMIN — Medication 600 MILLIGRAM(S): at 12:45

## 2020-09-26 NOTE — DISCHARGE NOTE OB - CARE PROVIDER_API CALL
Tia Alexis  OBSTETRICS AND GYNECOLOGY  [address] [facility], [address]  [place] [address]  Phone: [phone]  Fax: [phone]  Follow Up Time:

## 2020-09-26 NOTE — DISCHARGE NOTE OB - HOSPITAL COURSE
Patient admitted for induction of labor, had uncomplicated induction and spontaneous vaginal delivery 9/24/20.  Patient had uncomplicated post partum course and achieved all postpartum milestones.  She was discharged home in stable condition 9/26/20.  Patient instructed to follow up in office for post partum visit.  Pelvic rest until cleared by physician. Patient admitted for induction of labor due to history of SLE, had uncomplicated induction and spontaneous vaginal delivery 9/24/20.  Patient had uncomplicated post partum course and achieved all postpartum milestones.  She was discharged home in stable condition 9/26/20.  Patient instructed to follow up in office for post partum visit.  Pelvic rest until cleared by physician.

## 2020-09-26 NOTE — DISCHARGE NOTE OB - CARE PLAN
Principal Discharge DX:	Vaginal delivery  Goal:	return to normal activities as tolerated  Assessment and plan of treatment:	return to normal activities as tolerated

## 2020-09-26 NOTE — DISCHARGE NOTE OB - MEDICATION SUMMARY - MEDICATIONS TO STOP TAKING
I will STOP taking the medications listed below when I get home from the hospital:    amLODIPine 2.5 mg oral tablet  -- 1 tab(s) by mouth once a day    Percocet 5/325 oral tablet  -- 1 tab(s) by mouth every 4 hours, As Needed MDD:6 tabs  -- Caution federal law prohibits the transfer of this drug to any person other  than the person for whom it was prescribed.  May cause drowsiness.  Alcohol may intensify this effect.  Use care when operating dangerous machinery.  This prescription cannot be refilled.  This product contains acetaminophen.  Do not use  with any other product containing acetaminophen to prevent possible liver damage.  Using more of this medication than prescribed may cause serious breathing problems.

## 2020-09-26 NOTE — DISCHARGE NOTE OB - PATIENT PORTAL LINK FT
You can access the FollowMyHealth Patient Portal offered by [facility] by registering at the following website: [email or web address]. By joining [facility]’s FollowMyHealth portal, you will also be able to view your health information using other applications (apps) compatible with our system.

## 2020-09-26 NOTE — DISCHARGE NOTE OB - MATERIALS PROVIDED
Immunization Record/Breastfeeding Mother’s Support Group Information/Guide to Postpartum Care/Vaccinations/Breastfeeding Log/Shaken Baby Prevention Handout/Breastfeeding Guide and Packet/Birth Certificate Instructions/Back To Sleep Handout/[facility] Hearing Screen Program/[facility] [place] Screening Program

## 2020-09-26 NOTE — PROGRESS NOTE ADULT - SUBJECTIVE AND OBJECTIVE BOX
S: Patient doing well. no complaints.  out of bed, tolerating regular diet.  breast feeding. voiding spontaneously. Minimal lochia. Pain controlled.    O: Vital Signs Last 24 Hrs  T(C): 36.8 (26 Sep 2020 05:17), Max: 36.8 (26 Sep 2020 05:17)  T(F): 98.2 (26 Sep 2020 05:17), Max: 98.2 (26 Sep 2020 05:17)  HR: 95 (26 Sep 2020 05:17) (78 - 95)  BP: 95/60 (26 Sep 2020 05:17) (95/60 - 96/62)  BP(mean): --  RR: 18 (26 Sep 2020 05:17) (17 - 18)  SpO2: 97% (26 Sep 2020 05:17) (97% - 98%)    Gen: NAD  Abd: soft, nontender, nondistended, fundus firm below umbilicus  Lochia: moderate  Ext: warm, well perfused bilaterally, nontender bilaterally        A: 39yo  PPD#2 s/p  doing well.    Plan:  1. ID: no signs/symptoms infection  2. uro: voiding spontaneously  3. heme: no signs/symptoms anemia  4. GI: tolerating regular diet  5. Neuro: po analgesia prn  6. OB: breast feeding  7. Dispo: d/c home. to call office to schedule post partum visit. pelvic rest until cleared by MD  - all questions/concerns addressed of pt and her partner    Krystina Denis MD

## 2020-09-26 NOTE — DISCHARGE NOTE OB - MEDICATION SUMMARY - MEDICATIONS TO TAKE
I will START or STAY ON the medications listed below when I get home from the hospital:    acetaminophen 325 mg oral tablet  -- 3 tab(s) by mouth   -- Indication: For mild pain    ibuprofen 600 mg oral tablet  -- 1 tab(s) by mouth every 6 hours  -- Indication: For mild pain    Prenatal Multivitamins with Folic Acid 1 mg oral tablet  -- 1 tab(s) by mouth once a day  -- Indication: For nutrition

## 2020-09-29 ENCOUNTER — APPOINTMENT (OUTPATIENT)
Dept: ANTEPARTUM | Facility: CLINIC | Age: 40
End: 2020-09-29

## 2020-10-13 NOTE — H&P PST ADULT - BP NONINVASIVE MEAN (MM HG)
Well , 4 Years Old  Well-child exams are recommended visits with a health care provider to track your child's growth and development at certain ages. This sheet tells you what to expect during this visit.  Recommended immunizations  · Your child may get doses of the following vaccines if needed to catch up on missed doses:  ? Hepatitis B vaccine.  ? Diphtheria and tetanus toxoids and acellular pertussis (DTaP) vaccine.  ? Inactivated poliovirus vaccine.  ? Measles, mumps, and rubella (MMR) vaccine.  ? Varicella vaccine.  · Haemophilus influenzae type b (Hib) vaccine. Your child may get doses of this vaccine if needed to catch up on missed doses, or if he or she has certain high-risk conditions.  · Pneumococcal conjugate (PCV13) vaccine. Your child may get this vaccine if he or she:  ? Has certain high-risk conditions.  ? Missed a previous dose.  ? Received the 7-valent pneumococcal vaccine (PCV7).  · Pneumococcal polysaccharide (PPSV23) vaccine. Your child may get this vaccine if he or she has certain high-risk conditions.  · Influenza vaccine (flu shot). Starting at age 6 months, your child should be given the flu shot every year. Children between the ages of 6 months and 8 years who get the flu shot for the first time should get a second dose at least 4 weeks after the first dose. After that, only a single yearly (annual) dose is recommended.  · Hepatitis A vaccine. Children who were given 1 dose before 2 years of age should receive a second dose 6-18 months after the first dose. If the first dose was not given by 2 years of age, your child should get this vaccine only if he or she is at risk for infection, or if you want your child to have hepatitis A protection.  · Meningococcal conjugate vaccine. Children who have certain high-risk conditions, are present during an outbreak, or are traveling to a country with a high rate of meningitis should be given this vaccine.  Your child may receive vaccines as  "individual doses or as more than one vaccine together in one shot (combination vaccines). Talk with your child's health care provider about the risks and benefits of combination vaccines.  Testing  Vision  · Starting at age 3, have your child's vision checked once a year. Finding and treating eye problems early is important for your child's development and readiness for school.  · If an eye problem is found, your child:  ? May be prescribed eyeglasses.  ? May have more tests done.  ? May need to visit an eye specialist.  Other tests  · Talk with your child's health care provider about the need for certain screenings. Depending on your child's risk factors, your child's health care provider may screen for:  ? Growth (developmental)problems.  ? Low red blood cell count (anemia).  ? Hearing problems.  ? Lead poisoning.  ? Tuberculosis (TB).  ? High cholesterol.  · Your child's health care provider will measure your child's BMI (body mass index) to screen for obesity.  · Starting at age 3, your child should have his or her blood pressure checked at least once a year.  General instructions  Parenting tips  · Your child may be curious about the differences between boys and girls, as well as where babies come from. Answer your child's questions honestly and at his or her level of communication. Try to use the appropriate terms, such as \"penis\" and \"vagina.\"  · Praise your child's good behavior.  · Provide structure and daily routines for your child.  · Set consistent limits. Keep rules for your child clear, short, and simple.  · Discipline your child consistently and fairly.  ? Avoid shouting at or spanking your child.  ? Make sure your child's caregivers are consistent with your discipline routines.  ? Recognize that your child is still learning about consequences at this age.  · Provide your child with choices throughout the day. Try not to say \"no\" to everything.  · Provide your child with a warning when getting ready " "to change activities (\"one more minute, then all done\").  · Try to help your child resolve conflicts with other children in a fair and calm way.  · Interrupt your child's inappropriate behavior and show him or her what to do instead. You can also remove your child from the situation and have him or her do a more appropriate activity. For some children, it is helpful to sit out from the activity briefly and then rejoin the activity. This is called having a time-out.  Oral health  · Help your child brush his or her teeth. Your child's teeth should be brushed twice a day (in the morning and before bed) with a pea-sized amount of fluoride toothpaste.  · Give fluoride supplements or apply fluoride varnish to your child's teeth as told by your child's health care provider.  · Schedule a dental visit for your child.  · Check your child's teeth for brown or white spots. These are signs of tooth decay.  Sleep    · Children this age need 10-13 hours of sleep a day. Many children may still take an afternoon nap, and others may stop napping.  · Keep naptime and bedtime routines consistent.  · Have your child sleep in his or her own sleep space.  · Do something quiet and calming right before bedtime to help your child settle down.  · Reassure your child if he or she has nighttime fears. These are common at this age.  Toilet training  · Most 3-year-olds are trained to use the toilet during the day and rarely have daytime accidents.  · Nighttime bed-wetting accidents while sleeping are normal at this age and do not require treatment.  · Talk with your health care provider if you need help toilet training your child or if your child is resisting toilet training.  What's next?  Your next visit will take place when your child is 5 years old.  Summary  · Depending on your child's risk factors, your child's health care provider may screen for various conditions at this visit.  · Have your child's vision checked once a year starting at " age 3.  · Your child's teeth should be brushed two times a day (in the morning and before bed) with a pea-sized amount of fluoride toothpaste.  · Reassure your child if he or she has nighttime fears. These are common at this age.  · Nighttime bed-wetting accidents while sleeping are normal at this age, and do not require treatment.  This information is not intended to replace advice given to you by your health care provider. Make sure you discuss any questions you have with your health care provider.  Document Released: 11/15/2006 Document Revised: 04/07/2020 Document Reviewed: 09/13/2019  Elsevier Patient Education © 2020 [employer]     88

## 2020-11-03 ENCOUNTER — APPOINTMENT (OUTPATIENT)
Dept: OBGYN | Facility: CLINIC | Age: 40
End: 2020-11-03
Payer: COMMERCIAL

## 2020-11-03 VITALS
WEIGHT: 173 LBS | SYSTOLIC BLOOD PRESSURE: 108 MMHG | HEIGHT: 64 IN | TEMPERATURE: 97.3 F | BODY MASS INDEX: 29.53 KG/M2 | DIASTOLIC BLOOD PRESSURE: 74 MMHG

## 2020-11-03 DIAGNOSIS — Z34.03 ENCOUNTER FOR SUPERVISION OF NORMAL FIRST PREGNANCY, THIRD TRIMESTER: ICD-10-CM

## 2020-11-03 DIAGNOSIS — Z87.898 PERSONAL HISTORY OF OTHER SPECIFIED CONDITIONS: ICD-10-CM

## 2020-11-03 DIAGNOSIS — M54.5 LOW BACK PAIN: ICD-10-CM

## 2020-11-03 DIAGNOSIS — Z87.09 PERSONAL HISTORY OF OTHER DISEASES OF THE RESPIRATORY SYSTEM: ICD-10-CM

## 2020-11-03 PROCEDURE — 0503F POSTPARTUM CARE VISIT: CPT

## 2020-11-03 NOTE — HISTORY OF PRESENT ILLNESS
[Postpartum Follow Up] : postpartum follow up [Complications:___] : complications include: [unfilled] [] : delivered by vaginal delivery [Male] : Delivery History: baby boy [Breastfeeding] : currently nursing [S/Sx PP Depression] : no signs/symptoms of postpartum depression [Suicidal Ideation] : no suicidal ideation [Back to Normal] : is back to normal in size [None] : no vaginal bleeding [Normal] : the vagina was normal [Cervix Sample Taken] : cervical sample taken for a Pap smear [Examination Of The Breasts] : breasts are normal [FreeTextEntry9] : Lupus  [de-identified] : some lightheadedness

## 2020-11-04 ENCOUNTER — TRANSCRIPTION ENCOUNTER (OUTPATIENT)
Age: 40
End: 2020-11-04

## 2020-11-04 LAB
BASOPHILS # BLD AUTO: 0.02 K/UL
BASOPHILS NFR BLD AUTO: 0.4 %
EOSINOPHIL # BLD AUTO: 0.11 K/UL
EOSINOPHIL NFR BLD AUTO: 2 %
HCT VFR BLD CALC: 41.5 %
HGB BLD-MCNC: 12.3 G/DL
IMM GRANULOCYTES NFR BLD AUTO: 0.2 %
LYMPHOCYTES # BLD AUTO: 1.99 K/UL
LYMPHOCYTES NFR BLD AUTO: 35.7 %
MAN DIFF?: NORMAL
MCHC RBC-ENTMCNC: 26.6 PG
MCHC RBC-ENTMCNC: 29.6 GM/DL
MCV RBC AUTO: 89.8 FL
MONOCYTES # BLD AUTO: 0.38 K/UL
MONOCYTES NFR BLD AUTO: 6.8 %
NEUTROPHILS # BLD AUTO: 3.06 K/UL
NEUTROPHILS NFR BLD AUTO: 54.9 %
PLATELET # BLD AUTO: 194 K/UL
RBC # BLD: 4.62 M/UL
RBC # FLD: 17.2 %
WBC # FLD AUTO: 5.57 K/UL

## 2020-11-05 LAB
C TRACH RRNA SPEC QL NAA+PROBE: NOT DETECTED
HPV HIGH+LOW RISK DNA PNL CVX: NOT DETECTED
N GONORRHOEA RRNA SPEC QL NAA+PROBE: NOT DETECTED
SOURCE AMPLIFICATION: NORMAL

## 2020-11-09 LAB — CYTOLOGY CVX/VAG DOC THIN PREP: NORMAL

## 2020-12-15 ENCOUNTER — APPOINTMENT (OUTPATIENT)
Dept: OBGYN | Facility: CLINIC | Age: 40
End: 2020-12-15
Payer: COMMERCIAL

## 2020-12-15 VITALS
TEMPERATURE: 96.9 F | BODY MASS INDEX: 29.53 KG/M2 | DIASTOLIC BLOOD PRESSURE: 80 MMHG | SYSTOLIC BLOOD PRESSURE: 114 MMHG | HEIGHT: 64 IN | WEIGHT: 173 LBS

## 2020-12-15 DIAGNOSIS — Z30.430 ENCOUNTER FOR INSERTION OF INTRAUTERINE CONTRACEPTIVE DEVICE: ICD-10-CM

## 2020-12-15 PROCEDURE — 58300 INSERT INTRAUTERINE DEVICE: CPT

## 2020-12-15 PROCEDURE — 76857 US EXAM PELVIC LIMITED: CPT

## 2020-12-15 PROCEDURE — 99072 ADDL SUPL MATRL&STAF TM PHE: CPT

## 2020-12-15 NOTE — PROCEDURE
[Locate IUD] : locate IUD [Transvaginal Ultrasound] : transvaginal ultrasound [IUD Placement] : intrauterine device (IUD) placement [Time out performed] : Pre-procedure time out performed.  Patient's name, date of birth and procedure confirmed. [Consent Obtained] : Consent obtained [Risks] : risks [Benefits] : benefits [Alternatives] : alternatives [Patient] : patient [Infection] : infection [Bleeding] : bleeding [Pain] : pain [Expulsion] : expulsion [Failure] : failure [Uterine Perforation] : uterine perforation [Neg Pregnancy Test] : negative pregnancy test [Neg GC/Chlamydia] : negative GC/Chlamydia [No Premedication] : No premedication [Betadine] : Betadine [Tenaculum] : Tenaculum [Easy Passage] : Easy passage [ParaGard IUD] : ParaGard IUD [Tolerated Well] : Patient tolerated the procedure well [No Complications] : No complications [None] : None [FreeTextEntry3] : IUD in cavity  [de-identified] : [id number] [de-identified] : 7/26 [de-identified] : 12/32

## 2024-08-02 ENCOUNTER — APPOINTMENT (OUTPATIENT)
Dept: RHEUMATOLOGY | Facility: CLINIC | Age: 44
End: 2024-08-02

## 2024-10-05 ENCOUNTER — OUTPATIENT (OUTPATIENT)
Dept: OUTPATIENT SERVICES | Facility: HOSPITAL | Age: 44
LOS: 1 days | End: 2024-10-05
Payer: COMMERCIAL

## 2024-10-05 ENCOUNTER — APPOINTMENT (OUTPATIENT)
Dept: ULTRASOUND IMAGING | Facility: CLINIC | Age: 44
End: 2024-10-05
Payer: COMMERCIAL

## 2024-10-05 ENCOUNTER — APPOINTMENT (OUTPATIENT)
Dept: MAMMOGRAPHY | Facility: CLINIC | Age: 44
End: 2024-10-05
Payer: COMMERCIAL

## 2024-10-05 DIAGNOSIS — N75.0 CYST OF BARTHOLIN'S GLAND: Chronic | ICD-10-CM

## 2024-10-05 DIAGNOSIS — Z00.8 ENCOUNTER FOR OTHER GENERAL EXAMINATION: ICD-10-CM

## 2024-10-05 DIAGNOSIS — Z00.00 ENCOUNTER FOR GENERAL ADULT MEDICAL EXAMINATION WITHOUT ABNORMAL FINDINGS: ICD-10-CM

## 2024-10-05 PROCEDURE — 77063 BREAST TOMOSYNTHESIS BI: CPT | Mod: 26

## 2024-10-05 PROCEDURE — 77067 SCR MAMMO BI INCL CAD: CPT | Mod: 26

## 2024-10-05 PROCEDURE — 76641 ULTRASOUND BREAST COMPLETE: CPT

## 2024-10-05 PROCEDURE — 77063 BREAST TOMOSYNTHESIS BI: CPT

## 2024-10-05 PROCEDURE — 77067 SCR MAMMO BI INCL CAD: CPT

## 2024-10-05 PROCEDURE — 76641 ULTRASOUND BREAST COMPLETE: CPT | Mod: 26,50

## 2024-10-11 NOTE — PROGRESS NOTE ADULT - ATTENDING COMMENTS
Urine analysis reviewed with symptom frequency suspected UTI awaiting culture will start Cipro 500 twice a day was sent in the [facility] pharmacy   Pt w no complaints.  +OOB     +jessenia reg  VSS  Cont PP care

## 2025-04-02 PROBLEM — Z34.03 ENCOUNTER FOR PRENATAL CARE IN THIRD TRIMESTER OF FIRST PREGNANCY: Status: RESOLVED | Noted: 2020-06-09 | Resolved: 2025-04-02

## 2025-04-06 ENCOUNTER — NON-APPOINTMENT (OUTPATIENT)
Age: 45
End: 2025-04-06

## 2025-04-10 ENCOUNTER — APPOINTMENT (OUTPATIENT)
Dept: INTERNAL MEDICINE | Facility: CLINIC | Age: 45
End: 2025-04-10
Payer: COMMERCIAL

## 2025-04-10 ENCOUNTER — NON-APPOINTMENT (OUTPATIENT)
Age: 45
End: 2025-04-10

## 2025-04-10 VITALS
HEIGHT: 64 IN | SYSTOLIC BLOOD PRESSURE: 112 MMHG | OXYGEN SATURATION: 99 % | BODY MASS INDEX: 26.46 KG/M2 | HEART RATE: 71 BPM | WEIGHT: 155 LBS | DIASTOLIC BLOOD PRESSURE: 69 MMHG | RESPIRATION RATE: 17 BRPM | TEMPERATURE: 97.8 F

## 2025-04-10 DIAGNOSIS — Z87.898 PERSONAL HISTORY OF OTHER SPECIFIED CONDITIONS: ICD-10-CM

## 2025-04-10 DIAGNOSIS — Z87.39 PERSONAL HISTORY OF OTHER DISEASES OF THE MUSCULOSKELETAL SYSTEM AND CONNECTIVE TISSUE: ICD-10-CM

## 2025-04-10 DIAGNOSIS — Z86.2 PERSONAL HISTORY OF DISEASES OF THE BLOOD AND BLOOD-FORMING ORGANS AND CERTAIN DISORDERS INVOLVING THE IMMUNE MECHANISM: ICD-10-CM

## 2025-04-10 DIAGNOSIS — E55.9 VITAMIN D DEFICIENCY, UNSPECIFIED: ICD-10-CM

## 2025-04-10 DIAGNOSIS — Z86.19 PERSONAL HISTORY OF OTHER INFECTIOUS AND PARASITIC DISEASES: ICD-10-CM

## 2025-04-10 DIAGNOSIS — J34.89 OTHER SPECIFIED DISORDERS OF NOSE AND NASAL SINUSES: ICD-10-CM

## 2025-04-10 DIAGNOSIS — R21 RASH AND OTHER NONSPECIFIC SKIN ERUPTION: ICD-10-CM

## 2025-04-10 DIAGNOSIS — R59.9 ENLARGED LYMPH NODES, UNSPECIFIED: ICD-10-CM

## 2025-04-10 DIAGNOSIS — Z82.5 FAMILY HISTORY OF ASTHMA AND OTHER CHRONIC LOWER RESPIRATORY DISEASES: ICD-10-CM

## 2025-04-10 DIAGNOSIS — M35.1 OTHER OVERLAP SYNDROMES: ICD-10-CM

## 2025-04-10 DIAGNOSIS — M25.60 STIFFNESS OF UNSPECIFIED JOINT, NOT ELSEWHERE CLASSIFIED: ICD-10-CM

## 2025-04-10 DIAGNOSIS — M25.642 STIFFNESS OF LEFT HAND, NOT ELSEWHERE CLASSIFIED: ICD-10-CM

## 2025-04-10 DIAGNOSIS — M62.838 OTHER MUSCLE SPASM: ICD-10-CM

## 2025-04-10 DIAGNOSIS — Z86.39 PERSONAL HISTORY OF OTHER ENDOCRINE, NUTRITIONAL AND METABOLIC DISEASE: ICD-10-CM

## 2025-04-10 DIAGNOSIS — Z00.00 ENCOUNTER FOR GENERAL ADULT MEDICAL EXAMINATION W/OUT ABNORMAL FINDINGS: ICD-10-CM

## 2025-04-10 DIAGNOSIS — M32.9 SYSTEMIC LUPUS ERYTHEMATOSUS, UNSPECIFIED: ICD-10-CM

## 2025-04-10 DIAGNOSIS — Z30.430 ENCOUNTER FOR INSERTION OF INTRAUTERINE CONTRACEPTIVE DEVICE: ICD-10-CM

## 2025-04-10 PROCEDURE — 93000 ELECTROCARDIOGRAM COMPLETE: CPT

## 2025-04-10 PROCEDURE — 99386 PREV VISIT NEW AGE 40-64: CPT

## 2025-05-31 ENCOUNTER — LABORATORY RESULT (OUTPATIENT)
Age: 45
End: 2025-05-31

## 2025-06-02 ENCOUNTER — TRANSCRIPTION ENCOUNTER (OUTPATIENT)
Age: 45
End: 2025-06-02

## 2025-07-30 ENCOUNTER — NON-APPOINTMENT (OUTPATIENT)
Age: 45
End: 2025-07-30